# Patient Record
Sex: MALE | Race: WHITE | NOT HISPANIC OR LATINO | Employment: FULL TIME | URBAN - METROPOLITAN AREA
[De-identification: names, ages, dates, MRNs, and addresses within clinical notes are randomized per-mention and may not be internally consistent; named-entity substitution may affect disease eponyms.]

---

## 2023-12-02 ENCOUNTER — APPOINTMENT (EMERGENCY)
Dept: CT IMAGING | Facility: HOSPITAL | Age: 58
DRG: 378 | End: 2023-12-02
Payer: COMMERCIAL

## 2023-12-02 ENCOUNTER — HOSPITAL ENCOUNTER (INPATIENT)
Facility: HOSPITAL | Age: 58
LOS: 3 days | Discharge: HOME | DRG: 378 | End: 2023-12-05
Attending: EMERGENCY MEDICINE | Admitting: STUDENT IN AN ORGANIZED HEALTH CARE EDUCATION/TRAINING PROGRAM
Payer: COMMERCIAL

## 2023-12-02 DIAGNOSIS — D72.829 LEUKOCYTOSIS: ICD-10-CM

## 2023-12-02 DIAGNOSIS — E87.1 HYPONATREMIA: ICD-10-CM

## 2023-12-02 DIAGNOSIS — K92.2 GI BLEED: Primary | ICD-10-CM

## 2023-12-02 PROBLEM — R65.10 SIRS (SYSTEMIC INFLAMMATORY RESPONSE SYNDROME) (HCC): Status: ACTIVE | Noted: 2023-12-02

## 2023-12-02 PROBLEM — R73.03 PREDIABETES: Status: ACTIVE | Noted: 2023-12-02

## 2023-12-02 PROBLEM — E78.5 HLD (HYPERLIPIDEMIA): Status: ACTIVE | Noted: 2023-12-02

## 2023-12-02 PROBLEM — B20: Status: ACTIVE | Noted: 2023-12-02

## 2023-12-02 PROBLEM — Z21: Status: ACTIVE | Noted: 2023-12-02

## 2023-12-02 LAB
ABO GROUP BLD: NORMAL
ABO GROUP BLD: NORMAL
ALBUMIN SERPL BCP-MCNC: 3.9 G/DL (ref 3.5–5)
ALP SERPL-CCNC: 55 U/L (ref 34–104)
ALT SERPL W P-5'-P-CCNC: 15 U/L (ref 7–52)
ANION GAP SERPL CALCULATED.3IONS-SCNC: 12 MMOL/L
APTT PPP: 24 SECONDS (ref 23–37)
AST SERPL W P-5'-P-CCNC: 16 U/L (ref 13–39)
BACTERIA UR QL AUTO: NORMAL /HPF
BASOPHILS # BLD AUTO: 0.08 THOUSANDS/ÂΜL (ref 0–0.1)
BASOPHILS NFR BLD AUTO: 1 % (ref 0–1)
BILIRUB SERPL-MCNC: 0.5 MG/DL (ref 0.2–1)
BILIRUB UR QL STRIP: NEGATIVE
BLD GP AB SCN SERPL QL: NEGATIVE
BUN SERPL-MCNC: 20 MG/DL (ref 5–25)
CALCIUM SERPL-MCNC: 8.9 MG/DL (ref 8.4–10.2)
CHLORIDE SERPL-SCNC: 98 MMOL/L (ref 96–108)
CLARITY UR: CLEAR
CO2 SERPL-SCNC: 21 MMOL/L (ref 21–32)
COLOR UR: YELLOW
CREAT SERPL-MCNC: 0.78 MG/DL (ref 0.6–1.3)
EOSINOPHIL # BLD AUTO: 0.13 THOUSAND/ÂΜL (ref 0–0.61)
EOSINOPHIL NFR BLD AUTO: 1 % (ref 0–6)
ERYTHROCYTE [DISTWIDTH] IN BLOOD BY AUTOMATED COUNT: 13.3 % (ref 11.6–15.1)
FERRITIN SERPL-MCNC: 106 NG/ML (ref 24–336)
GFR SERPL CREATININE-BSD FRML MDRD: 98 ML/MIN/1.73SQ M
GLUCOSE SERPL-MCNC: 145 MG/DL (ref 65–140)
GLUCOSE SERPL-MCNC: 176 MG/DL (ref 65–140)
GLUCOSE SERPL-MCNC: 217 MG/DL (ref 65–140)
GLUCOSE UR STRIP-MCNC: NEGATIVE MG/DL
HCT VFR BLD AUTO: 23.9 % (ref 36.5–49.3)
HCT VFR BLD AUTO: 23.9 % (ref 36.5–49.3)
HCT VFR BLD AUTO: 25.9 % (ref 36.5–49.3)
HEMOCCULT STL QL IA: POSITIVE
HGB BLD-MCNC: 8.1 G/DL (ref 12–17)
HGB BLD-MCNC: 8.2 G/DL (ref 12–17)
HGB BLD-MCNC: 8.7 G/DL (ref 12–17)
HGB UR QL STRIP.AUTO: NEGATIVE
IMM GRANULOCYTES # BLD AUTO: 0.17 THOUSAND/UL (ref 0–0.2)
IMM GRANULOCYTES NFR BLD AUTO: 1 % (ref 0–2)
INR PPP: 1.04 (ref 0.84–1.19)
IRON SATN MFR SERPL: 23 % (ref 15–50)
IRON SERPL-MCNC: 85 UG/DL (ref 50–212)
KETONES UR STRIP-MCNC: NEGATIVE MG/DL
LEUKOCYTE ESTERASE UR QL STRIP: NEGATIVE
LYMPHOCYTES # BLD AUTO: 3.7 THOUSANDS/ÂΜL (ref 0.6–4.47)
LYMPHOCYTES NFR BLD AUTO: 23 % (ref 14–44)
MCH RBC QN AUTO: 30.5 PG (ref 26.8–34.3)
MCHC RBC AUTO-ENTMCNC: 33.6 G/DL (ref 31.4–37.4)
MCV RBC AUTO: 91 FL (ref 82–98)
MONOCYTES # BLD AUTO: 1.2 THOUSAND/ÂΜL (ref 0.17–1.22)
MONOCYTES NFR BLD AUTO: 8 % (ref 4–12)
NEUTROPHILS # BLD AUTO: 10.68 THOUSANDS/ÂΜL (ref 1.85–7.62)
NEUTS SEG NFR BLD AUTO: 66 % (ref 43–75)
NITRITE UR QL STRIP: NEGATIVE
NON-SQ EPI CELLS URNS QL MICRO: NORMAL /HPF
NRBC BLD AUTO-RTO: 0 /100 WBCS
PH UR STRIP.AUTO: 5.5 [PH]
PLATELET # BLD AUTO: 247 THOUSANDS/UL (ref 149–390)
PMV BLD AUTO: 9.2 FL (ref 8.9–12.7)
POTASSIUM SERPL-SCNC: 3.9 MMOL/L (ref 3.5–5.3)
PROCALCITONIN SERPL-MCNC: 0.05 NG/ML
PROT SERPL-MCNC: 6.6 G/DL (ref 6.4–8.4)
PROT UR STRIP-MCNC: NEGATIVE MG/DL
PROTHROMBIN TIME: 13.5 SECONDS (ref 11.6–14.5)
RBC # BLD AUTO: 2.85 MILLION/UL (ref 3.88–5.62)
RBC #/AREA URNS AUTO: NORMAL /HPF
RH BLD: POSITIVE
RH BLD: POSITIVE
SODIUM SERPL-SCNC: 131 MMOL/L (ref 135–147)
SP GR UR STRIP.AUTO: 1.01 (ref 1–1.03)
SPECIMEN EXPIRATION DATE: NORMAL
TIBC SERPL-MCNC: 367 UG/DL (ref 250–450)
UIBC SERPL-MCNC: 282 UG/DL (ref 155–355)
UROBILINOGEN UR QL STRIP.AUTO: 0.2 E.U./DL
WBC # BLD AUTO: 15.96 THOUSAND/UL (ref 4.31–10.16)
WBC #/AREA URNS AUTO: NORMAL /HPF

## 2023-12-02 PROCEDURE — 85025 COMPLETE CBC W/AUTO DIFF WBC: CPT | Performed by: EMERGENCY MEDICINE

## 2023-12-02 PROCEDURE — 99285 EMERGENCY DEPT VISIT HI MDM: CPT

## 2023-12-02 PROCEDURE — 86850 RBC ANTIBODY SCREEN: CPT | Performed by: EMERGENCY MEDICINE

## 2023-12-02 PROCEDURE — 80053 COMPREHEN METABOLIC PANEL: CPT | Performed by: EMERGENCY MEDICINE

## 2023-12-02 PROCEDURE — 30233N1 TRANSFUSION OF NONAUTOLOGOUS RED BLOOD CELLS INTO PERIPHERAL VEIN, PERCUTANEOUS APPROACH: ICD-10-PCS | Performed by: STUDENT IN AN ORGANIZED HEALTH CARE EDUCATION/TRAINING PROGRAM

## 2023-12-02 PROCEDURE — 85610 PROTHROMBIN TIME: CPT | Performed by: EMERGENCY MEDICINE

## 2023-12-02 PROCEDURE — P9016 RBC LEUKOCYTES REDUCED: HCPCS

## 2023-12-02 PROCEDURE — 86920 COMPATIBILITY TEST SPIN: CPT

## 2023-12-02 PROCEDURE — 85014 HEMATOCRIT: CPT | Performed by: EMERGENCY MEDICINE

## 2023-12-02 PROCEDURE — 87040 BLOOD CULTURE FOR BACTERIA: CPT | Performed by: STUDENT IN AN ORGANIZED HEALTH CARE EDUCATION/TRAINING PROGRAM

## 2023-12-02 PROCEDURE — 85730 THROMBOPLASTIN TIME PARTIAL: CPT | Performed by: EMERGENCY MEDICINE

## 2023-12-02 PROCEDURE — 83550 IRON BINDING TEST: CPT | Performed by: STUDENT IN AN ORGANIZED HEALTH CARE EDUCATION/TRAINING PROGRAM

## 2023-12-02 PROCEDURE — 85014 HEMATOCRIT: CPT | Performed by: STUDENT IN AN ORGANIZED HEALTH CARE EDUCATION/TRAINING PROGRAM

## 2023-12-02 PROCEDURE — 99223 1ST HOSP IP/OBS HIGH 75: CPT | Performed by: STUDENT IN AN ORGANIZED HEALTH CARE EDUCATION/TRAINING PROGRAM

## 2023-12-02 PROCEDURE — 82948 REAGENT STRIP/BLOOD GLUCOSE: CPT

## 2023-12-02 PROCEDURE — 86900 BLOOD TYPING SEROLOGIC ABO: CPT | Performed by: EMERGENCY MEDICINE

## 2023-12-02 PROCEDURE — 96365 THER/PROPH/DIAG IV INF INIT: CPT

## 2023-12-02 PROCEDURE — C9113 INJ PANTOPRAZOLE SODIUM, VIA: HCPCS | Performed by: EMERGENCY MEDICINE

## 2023-12-02 PROCEDURE — 86901 BLOOD TYPING SEROLOGIC RH(D): CPT | Performed by: EMERGENCY MEDICINE

## 2023-12-02 PROCEDURE — 36415 COLL VENOUS BLD VENIPUNCTURE: CPT | Performed by: EMERGENCY MEDICINE

## 2023-12-02 PROCEDURE — 96375 TX/PRO/DX INJ NEW DRUG ADDON: CPT

## 2023-12-02 PROCEDURE — 74177 CT ABD & PELVIS W/CONTRAST: CPT

## 2023-12-02 PROCEDURE — 93005 ELECTROCARDIOGRAM TRACING: CPT

## 2023-12-02 PROCEDURE — 84145 PROCALCITONIN (PCT): CPT | Performed by: STUDENT IN AN ORGANIZED HEALTH CARE EDUCATION/TRAINING PROGRAM

## 2023-12-02 PROCEDURE — 81001 URINALYSIS AUTO W/SCOPE: CPT | Performed by: STUDENT IN AN ORGANIZED HEALTH CARE EDUCATION/TRAINING PROGRAM

## 2023-12-02 PROCEDURE — 82728 ASSAY OF FERRITIN: CPT | Performed by: STUDENT IN AN ORGANIZED HEALTH CARE EDUCATION/TRAINING PROGRAM

## 2023-12-02 PROCEDURE — G0328 FECAL BLOOD SCRN IMMUNOASSAY: HCPCS | Performed by: EMERGENCY MEDICINE

## 2023-12-02 PROCEDURE — 83540 ASSAY OF IRON: CPT | Performed by: STUDENT IN AN ORGANIZED HEALTH CARE EDUCATION/TRAINING PROGRAM

## 2023-12-02 PROCEDURE — C9113 INJ PANTOPRAZOLE SODIUM, VIA: HCPCS | Performed by: STUDENT IN AN ORGANIZED HEALTH CARE EDUCATION/TRAINING PROGRAM

## 2023-12-02 PROCEDURE — 85018 HEMOGLOBIN: CPT | Performed by: EMERGENCY MEDICINE

## 2023-12-02 PROCEDURE — 85018 HEMOGLOBIN: CPT | Performed by: STUDENT IN AN ORGANIZED HEALTH CARE EDUCATION/TRAINING PROGRAM

## 2023-12-02 RX ORDER — INSULIN LISPRO 100 [IU]/ML
2-12 INJECTION, SOLUTION INTRAVENOUS; SUBCUTANEOUS
Status: DISCONTINUED | OUTPATIENT
Start: 2023-12-02 | End: 2023-12-05 | Stop reason: HOSPADM

## 2023-12-02 RX ORDER — SODIUM CHLORIDE, SODIUM GLUCONATE, SODIUM ACETATE, POTASSIUM CHLORIDE, MAGNESIUM CHLORIDE, SODIUM PHOSPHATE, DIBASIC, AND POTASSIUM PHOSPHATE .53; .5; .37; .037; .03; .012; .00082 G/100ML; G/100ML; G/100ML; G/100ML; G/100ML; G/100ML; G/100ML
100 INJECTION, SOLUTION INTRAVENOUS CONTINUOUS
Status: DISCONTINUED | OUTPATIENT
Start: 2023-12-02 | End: 2023-12-02

## 2023-12-02 RX ORDER — ONDANSETRON 2 MG/ML
4 INJECTION INTRAMUSCULAR; INTRAVENOUS EVERY 4 HOURS PRN
Status: DISCONTINUED | OUTPATIENT
Start: 2023-12-02 | End: 2023-12-05 | Stop reason: HOSPADM

## 2023-12-02 RX ORDER — OCTREOTIDE ACETATE 100 UG/ML
50 INJECTION, SOLUTION INTRAVENOUS; SUBCUTANEOUS ONCE
Status: DISCONTINUED | OUTPATIENT
Start: 2023-12-02 | End: 2023-12-02

## 2023-12-02 RX ORDER — AMLODIPINE BESYLATE 10 MG/1
10 TABLET ORAL DAILY
Status: DISCONTINUED | OUTPATIENT
Start: 2023-12-03 | End: 2023-12-05 | Stop reason: HOSPADM

## 2023-12-02 RX ORDER — PANTOPRAZOLE SODIUM 40 MG/10ML
40 INJECTION, POWDER, LYOPHILIZED, FOR SOLUTION INTRAVENOUS ONCE
Status: COMPLETED | OUTPATIENT
Start: 2023-12-02 | End: 2023-12-02

## 2023-12-02 RX ORDER — CANDESARTAN 32 MG/1
32 TABLET ORAL DAILY
COMMUNITY

## 2023-12-02 RX ORDER — ATORVASTATIN CALCIUM 20 MG/1
20 TABLET, FILM COATED ORAL DAILY
Status: DISCONTINUED | OUTPATIENT
Start: 2023-12-03 | End: 2023-12-05 | Stop reason: HOSPADM

## 2023-12-02 RX ORDER — AMLODIPINE BESYLATE 5 MG/1
10 TABLET ORAL DAILY
COMMUNITY

## 2023-12-02 RX ORDER — SODIUM CHLORIDE, SODIUM GLUCONATE, SODIUM ACETATE, POTASSIUM CHLORIDE, MAGNESIUM CHLORIDE, SODIUM PHOSPHATE, DIBASIC, AND POTASSIUM PHOSPHATE .53; .5; .37; .037; .03; .012; .00082 G/100ML; G/100ML; G/100ML; G/100ML; G/100ML; G/100ML; G/100ML
75 INJECTION, SOLUTION INTRAVENOUS CONTINUOUS
Status: DISCONTINUED | OUTPATIENT
Start: 2023-12-02 | End: 2023-12-04

## 2023-12-02 RX ORDER — LAMIVUDINE 150 MG/1
300 TABLET, FILM COATED ORAL DAILY
Status: DISCONTINUED | OUTPATIENT
Start: 2023-12-03 | End: 2023-12-05 | Stop reason: HOSPADM

## 2023-12-02 RX ORDER — ATORVASTATIN CALCIUM 20 MG/1
20 TABLET, FILM COATED ORAL DAILY
COMMUNITY

## 2023-12-02 RX ADMIN — SODIUM CHLORIDE, SODIUM GLUCONATE, SODIUM ACETATE, POTASSIUM CHLORIDE AND MAGNESIUM CHLORIDE 75 ML/HR: 526; 502; 368; 37; 30 INJECTION, SOLUTION INTRAVENOUS at 20:42

## 2023-12-02 RX ADMIN — PANTOPRAZOLE SODIUM 40 MG: 40 INJECTION, POWDER, FOR SOLUTION INTRAVENOUS at 09:26

## 2023-12-02 RX ADMIN — INSULIN LISPRO 4 UNITS: 100 INJECTION, SOLUTION INTRAVENOUS; SUBCUTANEOUS at 21:36

## 2023-12-02 RX ADMIN — PANTOPRAZOLE SODIUM 40 MG: 40 INJECTION, POWDER, FOR SOLUTION INTRAVENOUS at 18:11

## 2023-12-02 RX ADMIN — IOHEXOL 100 ML: 350 INJECTION, SOLUTION INTRAVENOUS at 11:55

## 2023-12-02 RX ADMIN — SODIUM CHLORIDE 8 MG/HR: 9 INJECTION, SOLUTION INTRAVENOUS at 18:22

## 2023-12-02 RX ADMIN — SODIUM CHLORIDE, SODIUM GLUCONATE, SODIUM ACETATE, POTASSIUM CHLORIDE, MAGNESIUM CHLORIDE, SODIUM PHOSPHATE, DIBASIC, AND POTASSIUM PHOSPHATE 100 ML/HR: .53; .5; .37; .037; .03; .012; .00082 INJECTION, SOLUTION INTRAVENOUS at 14:09

## 2023-12-02 NOTE — ASSESSMENT & PLAN NOTE
Patient follows with HIV doctor in [facility] which is out of network, but states that he is adherent to his daily Dovato in the outpatient setting. He pulled up his labs on his phone to show me baseline hgb, but we were unable to find recent CD4 count  Will presume well-controlled HIV in the setting of baseline good health, for now continue HIV regimen in-house and can reach out to his HIV clinic provider in the outpatient setting  Continue Dovato  qd.

## 2023-12-02 NOTE — ED NOTES
Pt ambulatory to restroom. Steady, independent gait. Denies dizziness.       Geremias Ervin RN  12/02/23 2251

## 2023-12-02 NOTE — ASSESSMENT & PLAN NOTE
Patient presents for 3 days of painless bright red blood per rectum. First episode appeared a little dark, but subsequent stool was bright red and bloody. PMH notable for history of diverticulosis as well as gastric bypass  Came to ED where hgb noted to be 8.7 and 8.1 on repeat. No recent baseline but last CBC in June shows hgb 14.7  For acute anemia and tachycardia, patient planned for 1U prbc and patient referred for admission. GI consulted  Diagnostic considerations for painless BRBPR may include diverticular bleed vs brisk upper GI bleed, suspect the former as bleeding has been going on for ~72   Given hx of gastric bypass will start protonix gtt for now.  Followup H&H q6h, clear liquid diet  Low threshold to cover with abx if patient deteriorates or develops fever  Supplemental IVF, Zofran PRN nausea  If continued bleeding or hemodynamic instability, consider transfer for higher level of care

## 2023-12-02 NOTE — ASSESSMENT & PLAN NOTE
Present on admission for tachycardia and leukocytosis at 15.96  No obvious source of infection at this time. Patient generally asymptomatic aside from mild abdominal bloating.   GI bleed could be cause for leukocytosis and subsequent anemia could cause tachycardia  Check blood cultures, but will hold off of covering with antibiotics at this time  SD2 status

## 2023-12-02 NOTE — PLAN OF CARE

## 2023-12-02 NOTE — ED PROVIDER NOTES
History  Chief Complaint   Patient presents with    Rectal Bleeding     Pt presents to ED from home w/ dizziness, blood in stool, fatigue for two days. Blood is elizabeth red. 42-year-old male presenting to the emergency department with lightheadedness, blood in the stool, fatigue x 2 days. Previous history of HIV compliant with his HAART medication, most recently normal CD4 count last month per the patient and undetectable HIV load. Also has a history of gastric bypass in 2011, Hx of gastric bypass 2011, hypertension. Does not have a history of hepatitis. Had a normal colonoscopy approximately 4 years ago. No recent receptive anal intercourse. Has been over a year since anything was placed in his rectum. He notes significant fatigue/ lightheaded. Has had three episodes of BRBPR in last 2 days. Last episode this am.     Not on blood thinners or ASA. None       Past Medical History:   Diagnosis Date    HIV (human immunodeficiency virus infection) ([address])     Hypertension        History reviewed. No pertinent surgical history. History reviewed. No pertinent family history. I have reviewed and agree with the history as documented. E-Cigarette/Vaping    E-Cigarette Use Never User      E-Cigarette/Vaping Substances     Social History     Tobacco Use    Smoking status: Never    Smokeless tobacco: Never   Vaping Use    Vaping Use: Never used   Substance Use Topics    Alcohol use: Yes     Comment: "drink more than i should"    Drug use: Yes     Types: Marijuana       Review of Systems   Gastrointestinal:  Positive for abdominal pain. Rectal bleeding     All other systems reviewed and are negative. Physical Exam  Physical Exam  Vitals and nursing note reviewed. Constitutional:       General: He is not in acute distress. Appearance: He is well-developed. He is not diaphoretic. HENT:      Head: Normocephalic and atraumatic.       Right Ear: External ear normal.      Left Ear: External ear normal.   Eyes:      Conjunctiva/sclera: Conjunctivae normal.   Neck:      Trachea: No tracheal deviation. Cardiovascular:      Rate and Rhythm: Regular rhythm. Tachycardia present. Heart sounds: Normal heart sounds. No murmur heard. Pulmonary:      Effort: No respiratory distress. Breath sounds: Normal breath sounds. No stridor. No wheezing or rales. Abdominal:      General: Bowel sounds are normal. There is no distension. Palpations: Abdomen is soft. There is no mass. Tenderness: There is abdominal tenderness (mild diffuse). There is no guarding or rebound. Genitourinary:     Comments: No bright red blood in the rectal vault. No external hemorrhoids. Musculoskeletal:         General: No tenderness or deformity. Skin:     General: Skin is dry. Coloration: Skin is pale. Findings: No rash. Neurological:      Motor: No abnormal muscle tone. Coordination: Coordination normal.   Psychiatric:         Behavior: Behavior normal.         Thought Content:  Thought content normal.         Judgment: Judgment normal.         Vital Signs  ED Triage Vitals   Temperature Pulse Respirations Blood Pressure SpO2   12/02/23 0906 12/02/23 0906 12/02/23 0906 12/02/23 0906 12/02/23 0906   97.6 °F (36.4 °C) (!) 109 (!) 25 100/57 98 %      Temp src Heart Rate Source Patient Position - Orthostatic VS BP Location FiO2 (%)   -- 12/02/23 1030 12/02/23 1030 12/02/23 1030 --    Monitor Sitting Right arm       Pain Score       12/02/23 1529       4           Vitals:    12/02/23 1200 12/02/23 1245 12/02/23 1330 12/02/23 1400   BP: 125/71 140/74 138/75 146/67   Pulse: 84 96 95 101   Patient Position - Orthostatic VS: Sitting Sitting Sitting Sitting         Visual Acuity      ED Medications  Medications   pantoprazole (PROTONIX) injection 40 mg (40 mg Intravenous Given 12/2/23 0926)   iohexol (OMNIPAQUE) 240 MG/ML solution 50 mL (50 mL Oral Given 12/2/23 0955)   iohexol (OMNIPAQUE) 350 MG/ML injection (SINGLE-DOSE) 100 mL (100 mL Intravenous Given 12/2/23 1155)       Diagnostic Studies  Results Reviewed       Procedure Component Value Units Date/Time    Hemoglobin and hematocrit, blood [[id number]]  (Abnormal) Collected: 12/02/23 1508    Lab Status: Final result Specimen: Blood from Arm, Left Updated: 12/02/23 1512     Hemoglobin 8.1 g/dL      Hematocrit 23.9 %     Occult Blood, Fecal Immunochemical [[id number]]  (Abnormal) Collected: 12/02/23 0948    Lab Status: Final result Specimen: Stool from Per Rectum Updated: 12/02/23 1026     OCCULT BLD, FECAL IMMUNOLOGICAL Positive    Narrative:        Performed by Fecal Immunochemical Test.    Protime-INR [[id number]]  (Normal) Collected: 12/02/23 0921    Lab Status: Final result Specimen: Blood from Arm, Left Updated: 12/02/23 0945     Protime 13.5 seconds      INR 1.04    APTT [[id number]]  (Normal) Collected: 12/02/23 0921    Lab Status: Final result Specimen: Blood from Arm, Left Updated: 12/02/23 0945     PTT 24 seconds     Comprehensive metabolic panel [[id number]]  (Abnormal) Collected: 12/02/23 0921    Lab Status: Final result Specimen: Blood from Arm, Left Updated: 12/02/23 0942     Sodium 131 mmol/L      Potassium 3.9 mmol/L      Chloride 98 mmol/L      CO2 21 mmol/L      ANION GAP 12 mmol/L      BUN 20 mg/dL      Creatinine 0.78 mg/dL      Glucose 176 mg/dL      Calcium 8.9 mg/dL      AST 16 U/L      ALT 15 U/L      Alkaline Phosphatase 55 U/L      Total Protein 6.6 g/dL      Albumin 3.9 g/dL      Total Bilirubin 0.50 mg/dL      eGFR 98 ml/min/1.73sq m     Narrative:      [facility] guidelines for Chronic Kidney Disease (CKD):     Stage 1 with normal or high GFR (GFR > 90 mL/min/1.73 square meters)    Stage 2 Mild CKD (GFR = 60-89 mL/min/1.73 square meters)    Stage 3A Moderate CKD (GFR = 45-59 mL/min/1.73 square meters)    Stage 3B Moderate CKD (GFR = 30-44 mL/min/1.73 square meters)    Stage 4 Severe CKD (GFR = 15-29 mL/min/1.73 square meters)    Stage 5 End Stage CKD (GFR <15 mL/min/1.73 square meters)  Note: GFR calculation is accurate only with a steady state creatinine    CBC and differential [[id number]]  (Abnormal) Collected: 12/02/23 0921    Lab Status: Final result Specimen: Blood from Arm, Left Updated: 12/02/23 0927     WBC 15.96 Thousand/uL      RBC 2.85 Million/uL      Hemoglobin 8.7 g/dL      Hematocrit 25.9 %      MCV 91 fL      MCH 30.5 pg      MCHC 33.6 g/dL      RDW 13.3 %      MPV 9.2 fL      Platelets 722 Thousands/uL      nRBC 0 /100 WBCs      Neutrophils Relative 66 %      Immat GRANS % 1 %      Lymphocytes Relative 23 %      Monocytes Relative 8 %      Eosinophils Relative 1 %      Basophils Relative 1 %      Neutrophils Absolute 10.68 Thousands/µL      Immature Grans Absolute 0.17 Thousand/uL      Lymphocytes Absolute 3.70 Thousands/µL      Monocytes Absolute 1.20 Thousand/µL      Eosinophils Absolute 0.13 Thousand/µL      Basophils Absolute 0.08 Thousands/µL                    CT abdomen pelvis with contrast   Final Result by Hans Trent MD (12/02 1335)   1. No acute intra-abdominal pathology. Incidental findings as above. Workstation performed: [id number]                    Procedures  CriticalCare Time    Date/Time: 12/2/2023 3:40 PM    Performed by: Rhonda Napoles DO  Authorized by: Rhonda Napoles DO    Critical care provider statement:     Critical care time (minutes):  30    Critical care start time:  12/2/2023 2:40 PM    Critical care end time:  12/2/2023 3:10 PM    Critical care time was exclusive of:  Separately billable procedures and treating other patients and teaching time    Critical care was necessary to treat or prevent imminent or life-threatening deterioration of the following conditions: Blood loss anemia.     Critical care was time spent personally by me on the following activities:  Discussions with consultants, examination of patient, re-evaluation of patient's condition, ordering and review of radiographic studies, ordering and review of laboratory studies and ordering and performing treatments and interventions           ED Course  ED Course as of 12/02/23 1545   Sat Dec 02, 2023   0939 Hemoglobin(!): 8.7   0946 Contacted GI. Can hold off on transfer now. Obtain CT with PO and IV. BUN normal will not order octreotide. 1239 Procedure Note: EKG  Date/Time: 12/02/23 12:39 PM   Interpreted by: Adria Chase  Indications / Diagnosis: GI bleed  ECG reviewed by me, the ED Provider: yes   The EKG demonstrates:  Rhythm: sinus tach 103 bpm with occasional pvc  Intervals: normal intervals  Axis: normal axis  QRS/Blocks: normal QRS  ST Changes: No acute ST Changes, no STD/MARY.     1447 Spoke with GI. They believe the patient is okay to stay at [facility] ([place]). Will scope on Monday if needed. If rebleeds might need emergent transfer. Spoke with Dr. Joanne Lee. Okay for clear liquids. Possible cleanout tomorrow   0676 Spoke with GI. They recommended recommended transfer 1 unit of blood. Medical Decision Making  Patient presenting with 3 episodes of rectal bleeding in the last 2 days. Normotensive but mildly tachycardic. Appears pale to me. Concerning. Will evaluate for blood loss anemia, electrolyte abnormalities, uremia, colitis, diverticulitis, acute intra-abdominal process. Spoke with GI shortly upon arrival of the patient. They recommended obtaining labs and imaging including p.o. contrast.    No uremia to suggest upper GI bleed. Patient had decrease in his tachycardia while in the emergency department. Normal rate most the time he is in the ER. Normotensive. I recommended transfusing 1 unit of packed red blood cells. Workup also shows leukocytosis. Unsure etiology. Spoke to GI. They did not recommend transfer at this point. No bright red blood per her rectum while in the ER for approximately 6 hours.   They recommended keeping at [facility] ([place]). Consult. Transfer if patient has acute bleeding. Problems Addressed:  GI bleed: acute illness or injury  Hyponatremia: acute illness or injury  Leukocytosis: acute illness or injury    Amount and/or Complexity of Data Reviewed  Labs: ordered. Decision-making details documented in ED Course. Radiology: ordered. Risk  Prescription drug management. Decision regarding hospitalization. Disposition  Final diagnoses:   GI bleed   Leukocytosis   Hyponatremia     Time reflects when diagnosis was documented in both MDM as applicable and the Disposition within this note       Time User Action Codes Description Comment    12/2/2023  2:18 PM Lisa Flaherty [K92.2] GI bleed     12/2/2023  3:11 PM Vaughn Clifford [G59.651] Leukocytosis     12/2/2023  3:11 PM Lisa Flaherty [E87.1] Hyponatremia           ED Disposition       ED Disposition   Admit    Condition   Stable    Date/Time   Sat Dec 2, 2023  3:09 PM    Comment   Ruchi Banda should be transferred out to [facility] Follow-up Information    None         There are no discharge medications for this patient. No discharge procedures on file.     PDMP Review       None            ED Provider  Electronically Signed by             Dc Robertson DO  12/02/23 0259

## 2023-12-02 NOTE — ASSESSMENT & PLAN NOTE
Wbc 15.96, no obvious source of infection  Check UA, otherwise would hold antibiotics and monitor  Low threshold to cover if patient develops fever or clinical deterioration

## 2023-12-02 NOTE — H&P
[address]  H&P  Name: Jean Geiger 59 y.o. male I MRN: [id number]  Unit/Bed#: -01 I Date of Admission: 12/2/2023   Date of Service: 12/2/2023 I Hospital Day: 0      Assessment/Plan   HIV (human immunodeficiency virus) infection [facility])  Assessment & Plan  Patient follows with HIV doctor in [facility] which is out of network, but states that he is adherent to his daily Dovato in the outpatient setting. He pulled up his labs on his phone to show me baseline hgb, but we were unable to find recent CD4 count  Will presume well-controlled HIV in the setting of baseline good health, for now continue HIV regimen in-house and can reach out to his HIV clinic provider in the outpatient setting  Continue Dovato  qd. Prediabetes  Assessment & Plan  Patient on trulicity as outpt, last A1c 5.9  Hold in-house, ISS ordered    HLD (hyperlipidemia)  Assessment & Plan  Resume atorvastatin 20mg    SIRS (systemic inflammatory response syndrome) (HCC)  Assessment & Plan  Present on admission for tachycardia and leukocytosis at 15.96  No obvious source of infection at this time. Patient generally asymptomatic aside from mild abdominal bloating. GI bleed could be cause for leukocytosis and subsequent anemia could cause tachycardia  Check blood cultures, but will hold off of covering with antibiotics at this time  SD2 status    Leukocytosis  Assessment & Plan  Wbc 15.96, no obvious source of infection  Check UA, otherwise would hold antibiotics and monitor  Low threshold to cover if patient develops fever or clinical deterioration    * GI bleed  Assessment & Plan  Patient presents for 3 days of painless bright red blood per rectum. First episode appeared a little dark, but subsequent stool was bright red and bloody. PMH notable for history of diverticulosis as well as gastric bypass  Came to ED where hgb noted to be 8.7 and 8.1 on repeat.    No recent baseline but last CBC in June shows hgb 14.7  For acute anemia and tachycardia, patient planned for 1U prbc and patient referred for admission. GI consulted  Diagnostic considerations for painless BRBPR may include diverticular bleed vs brisk upper GI bleed, suspect the former as bleeding has been going on for ~72   Given hx of gastric bypass will start protonix gtt for now. Followup H&H q6h, clear liquid diet  Low threshold to cover with abx if patient deteriorates or develops fever  Supplemental IVF, Zofran PRN nausea         VTE Pharmacologic Prophylaxis:   Moderate Risk (Score 3-4) - Pharmacological DVT Prophylaxis Contraindicated. Sequential Compression Devices Ordered. Code Status: Level 1 - Full Code   Discussion with family: Patient declined call to . Anticipated Length of Stay: Patient will be admitted on an inpatient basis with an anticipated length of stay of greater than 2 midnights secondary to GI bleed. Total Time Spent on Date of Encounter in care of patient: 45 mins. This time was spent on one or more of the following: performing physical exam; counseling and coordination of care; obtaining or reviewing history; documenting in the medical record; reviewing/ordering tests, medications or procedures; communicating with other healthcare professionals and discussing with patient's family/caregivers. Chief Complaint: Bright red blood per rectum    History of Present Illness:  Makenna Woodard is a 59 y.o. male with a PMH of Hypertension, HIV, HLD, diverticulosis, gastric bypass, prediabetes who presents with Bright red blood per rectum. He states that he was in a normal state of health until about 3 days ago when he began to notice a bit of dark stool after a bowel movement. That was soon followed by multiple episodes of bright red blood per rectum.  He began taking imodium to try and slow the rate of bleeding, which he states may have actually helped slow down the frequency of bleeding to daily episodes of bright red blood per rectum. Wilian Robertson has a history of diverticulosis as well as gastric bypass. Denies nausea or vomiting. He has no abdominal pain but does endorse some mild bloating. He came to the ED where hgb was noted to be 8.7. GI was contacted and recommend admission and blood transfusion. At bedside Wilian Robertson states he is feeling better after IVF given in the ED. He has no new complaints at this time. Denies cough, rash, fever, chills, dysuria. Review of Systems:  Review of Systems   Constitutional:  Negative for fatigue and fever. Respiratory:  Negative for cough and wheezing. Cardiovascular:  Negative for chest pain. Gastrointestinal:  Positive for anal bleeding. Negative for abdominal pain, diarrhea, nausea and vomiting. Genitourinary:  Negative for dysuria. Musculoskeletal:  Negative for arthralgias. Skin:  Negative for rash. Neurological:  Negative for tremors and headaches. Past Medical and Surgical History:   Past Medical History:   Diagnosis Date    HIV (human immunodeficiency virus infection) ([address])     Hypertension        History reviewed. No pertinent surgical history. Meds/Allergies:  Prior to Admission medications    Medication Sig Start Date End Date Taking? Authorizing Provider   amLODIPine (NORVASC) 5 mg tablet Take 10 mg by mouth daily   Yes Historical Provider, MD   atorvastatin (LIPITOR) 20 mg tablet Take 20 mg by mouth daily   Yes Historical Provider, MD   candesartan (ATACAND) 32 MG tablet Take 32 mg by mouth daily   Yes Historical Provider, MD   Dolutegravir-lamiVUDine  MG TABS Take 1 tablet by mouth daily   Yes Historical Provider, MD   dulaglutide (Trulicity) 1.5 MG/7.3TH injection Inject under the skin every 7 days   Yes Historical Provider, MD     I have reviewed home medications with patient personally.     Allergies: No Known Allergies    Social History:  Marital Status: Single   Occupation: Not on file  Patient Pre-hospital Living Situation: Home  Patient Pre-hospital Level of Mobility: walks  Patient Pre-hospital Diet Restrictions: None  Substance Use History:   Social History     Substance and Sexual Activity   Alcohol Use Yes    Comment: "drink more than i should"     Social History     Tobacco Use   Smoking Status Never   Smokeless Tobacco Never     Social History     Substance and Sexual Activity   Drug Use Yes    Types: Marijuana       Family History:  History reviewed. No pertinent family history. Physical Exam:     Vitals:   Blood Pressure: 130/75 (12/02/23 1645)  Pulse: 85 (12/02/23 1645)  Temperature: 97.7 °F (36.5 °C) (12/02/23 1645)  Temp Source: Oral (12/02/23 1645)  Respirations: 18 (12/02/23 1645)  Height: 6' 6" (198.1 cm) (12/02/23 1529)  Weight - Scale: 125 kg (275 lb) (12/02/23 0907)  SpO2: 96 % (12/02/23 1645)    Physical Exam  Vitals and nursing note reviewed. Constitutional:       General: He is not in acute distress. Appearance: He is well-developed. He is not toxic-appearing or diaphoretic. HENT:      Head: Normocephalic and atraumatic. Mouth/Throat:      Mouth: Mucous membranes are moist.   Eyes:      General: No scleral icterus. Extraocular Movements: Extraocular movements intact. Conjunctiva/sclera: Conjunctivae normal.   Cardiovascular:      Rate and Rhythm: Regular rhythm. Tachycardia present. Pulses: Normal pulses. Heart sounds: No murmur heard. No friction rub. No gallop. Pulmonary:      Effort: Pulmonary effort is normal. No respiratory distress. Breath sounds: Normal breath sounds. No wheezing, rhonchi or rales. Abdominal:      General: Abdomen is flat. Bowel sounds are normal. There is no distension. Palpations: Abdomen is soft. There is no mass. Tenderness: There is no abdominal tenderness. There is no guarding. Musculoskeletal:         General: No swelling. Cervical back: Neck supple. Right lower leg: No edema. Left lower leg: No edema.    Lymphadenopathy: Cervical: No cervical adenopathy. Skin:     General: Skin is warm and dry. Capillary Refill: Capillary refill takes less than 2 seconds. Coloration: Skin is not jaundiced. Findings: No rash. Neurological:      General: No focal deficit present. Mental Status: He is alert and oriented to person, place, and time. Sensory: No sensory deficit. Motor: No weakness. Psychiatric:         Mood and Affect: Mood normal.          Additional Data:     Lab Results:  Results from last 7 days   Lab Units 12/02/23  1508 12/02/23  0921   WBC Thousand/uL  --  15.96*   HEMOGLOBIN g/dL 8.1* 8.7*   HEMATOCRIT % 23.9* 25.9*   PLATELETS Thousands/uL  --  247   NEUTROS PCT %  --  66   LYMPHS PCT %  --  23   MONOS PCT %  --  8   EOS PCT %  --  1     Results from last 7 days   Lab Units 12/02/23  0921   SODIUM mmol/L 131*   POTASSIUM mmol/L 3.9   CHLORIDE mmol/L 98   CO2 mmol/L 21   BUN mg/dL 20   CREATININE mg/dL 0.78   ANION GAP mmol/L 12   CALCIUM mg/dL 8.9   ALBUMIN g/dL 3.9   TOTAL BILIRUBIN mg/dL 0.50   ALK PHOS U/L 55   ALT U/L 15   AST U/L 16   GLUCOSE RANDOM mg/dL 176*     Results from last 7 days   Lab Units 12/02/23  0921   INR  1.04                   Lines/Drains:  Invasive Devices       Peripheral Intravenous Line  Duration             Peripheral IV 12/02/23 Left Arm <1 day                        Imaging: Reviewed radiology reports from this admission including: abdominal/pelvic CT  CT abdomen pelvis with contrast   Final Result by Eric Ambriz MD (12/02 1335)   1. No acute intra-abdominal pathology. Incidental findings as above. Workstation performed: [id number]             EKG and Other Studies Reviewed on Admission:   EKG: Sinus Tachycardia. . ** Please Note: This note has been constructed using a voice recognition system.  **

## 2023-12-03 LAB
ABO GROUP BLD BPU: NORMAL
ANION GAP SERPL CALCULATED.3IONS-SCNC: 8 MMOL/L
ATRIAL RATE: 103 BPM
BPU ID: NORMAL
BUN SERPL-MCNC: 13 MG/DL (ref 5–25)
CALCIUM SERPL-MCNC: 8.4 MG/DL (ref 8.4–10.2)
CHLORIDE SERPL-SCNC: 100 MMOL/L (ref 96–108)
CO2 SERPL-SCNC: 26 MMOL/L (ref 21–32)
CREAT SERPL-MCNC: 0.53 MG/DL (ref 0.6–1.3)
CROSSMATCH: NORMAL
ERYTHROCYTE [DISTWIDTH] IN BLOOD BY AUTOMATED COUNT: 14.6 % (ref 11.6–15.1)
GFR SERPL CREATININE-BSD FRML MDRD: 114 ML/MIN/1.73SQ M
GLUCOSE SERPL-MCNC: 124 MG/DL (ref 65–140)
GLUCOSE SERPL-MCNC: 127 MG/DL (ref 65–140)
GLUCOSE SERPL-MCNC: 134 MG/DL (ref 65–140)
GLUCOSE SERPL-MCNC: 146 MG/DL (ref 65–140)
GLUCOSE SERPL-MCNC: 155 MG/DL (ref 65–140)
HCT VFR BLD AUTO: 25 % (ref 36.5–49.3)
HCT VFR BLD AUTO: 25.6 % (ref 36.5–49.3)
HCT VFR BLD AUTO: 26 % (ref 36.5–49.3)
HCT VFR BLD AUTO: 26.1 % (ref 36.5–49.3)
HGB BLD-MCNC: 8.6 G/DL (ref 12–17)
HGB BLD-MCNC: 8.8 G/DL (ref 12–17)
HGB BLD-MCNC: 8.8 G/DL (ref 12–17)
HGB BLD-MCNC: 9 G/DL (ref 12–17)
MCH RBC QN AUTO: 30.6 PG (ref 26.8–34.3)
MCHC RBC AUTO-ENTMCNC: 33.7 G/DL (ref 31.4–37.4)
MCV RBC AUTO: 91 FL (ref 82–98)
P AXIS: 77 DEGREES
PLATELET # BLD AUTO: 198 THOUSANDS/UL (ref 149–390)
PMV BLD AUTO: 8.9 FL (ref 8.9–12.7)
POTASSIUM SERPL-SCNC: 3.8 MMOL/L (ref 3.5–5.3)
PR INTERVAL: 180 MS
PROCALCITONIN SERPL-MCNC: <0.05 NG/ML
QRS AXIS: 34 DEGREES
QRSD INTERVAL: 94 MS
QT INTERVAL: 368 MS
QTC INTERVAL: 482 MS
RBC # BLD AUTO: 2.88 MILLION/UL (ref 3.88–5.62)
SODIUM SERPL-SCNC: 134 MMOL/L (ref 135–147)
T WAVE AXIS: 71 DEGREES
UNIT DISPENSE STATUS: NORMAL
UNIT PRODUCT CODE: NORMAL
UNIT PRODUCT VOLUME: 350 ML
UNIT RH: NORMAL
VENTRICULAR RATE: 103 BPM
WBC # BLD AUTO: 10.48 THOUSAND/UL (ref 4.31–10.16)

## 2023-12-03 PROCEDURE — 85027 COMPLETE CBC AUTOMATED: CPT | Performed by: STUDENT IN AN ORGANIZED HEALTH CARE EDUCATION/TRAINING PROGRAM

## 2023-12-03 PROCEDURE — C9113 INJ PANTOPRAZOLE SODIUM, VIA: HCPCS | Performed by: STUDENT IN AN ORGANIZED HEALTH CARE EDUCATION/TRAINING PROGRAM

## 2023-12-03 PROCEDURE — P9016 RBC LEUKOCYTES REDUCED: HCPCS

## 2023-12-03 PROCEDURE — 99222 1ST HOSP IP/OBS MODERATE 55: CPT | Performed by: INTERNAL MEDICINE

## 2023-12-03 PROCEDURE — 84145 PROCALCITONIN (PCT): CPT | Performed by: PHYSICIAN ASSISTANT

## 2023-12-03 PROCEDURE — 85014 HEMATOCRIT: CPT | Performed by: STUDENT IN AN ORGANIZED HEALTH CARE EDUCATION/TRAINING PROGRAM

## 2023-12-03 PROCEDURE — 85018 HEMOGLOBIN: CPT | Performed by: STUDENT IN AN ORGANIZED HEALTH CARE EDUCATION/TRAINING PROGRAM

## 2023-12-03 PROCEDURE — 87505 NFCT AGENT DETECTION GI: CPT | Performed by: STUDENT IN AN ORGANIZED HEALTH CARE EDUCATION/TRAINING PROGRAM

## 2023-12-03 PROCEDURE — 82948 REAGENT STRIP/BLOOD GLUCOSE: CPT

## 2023-12-03 PROCEDURE — 80048 BASIC METABOLIC PNL TOTAL CA: CPT | Performed by: STUDENT IN AN ORGANIZED HEALTH CARE EDUCATION/TRAINING PROGRAM

## 2023-12-03 PROCEDURE — 99232 SBSQ HOSP IP/OBS MODERATE 35: CPT | Performed by: PHYSICIAN ASSISTANT

## 2023-12-03 RX ORDER — LEVOTHYROXINE SODIUM 112 UG/1
TABLET ORAL
Status: DISPENSED
Start: 2023-12-03 | End: 2023-12-03

## 2023-12-03 RX ORDER — ALPRAZOLAM 0.25 MG/1
0.25 TABLET ORAL 2 TIMES DAILY PRN
Status: DISCONTINUED | OUTPATIENT
Start: 2023-12-03 | End: 2023-12-05 | Stop reason: HOSPADM

## 2023-12-03 RX ORDER — POLYETHYLENE GLYCOL 3350 17 G/17G
238 POWDER, FOR SOLUTION ORAL ONCE
Status: COMPLETED | OUTPATIENT
Start: 2023-12-03 | End: 2023-12-03

## 2023-12-03 RX ORDER — LEVOTHYROXINE SODIUM 0.03 MG/1
TABLET ORAL
Status: DISPENSED
Start: 2023-12-03 | End: 2023-12-03

## 2023-12-03 RX ADMIN — DOXYLAMINE SUCCINATE 12.5 MG: 25 TABLET ORAL at 00:39

## 2023-12-03 RX ADMIN — INSULIN LISPRO 2 UNITS: 100 INJECTION, SOLUTION INTRAVENOUS; SUBCUTANEOUS at 17:02

## 2023-12-03 RX ADMIN — AMLODIPINE BESYLATE 10 MG: 10 TABLET ORAL at 08:35

## 2023-12-03 RX ADMIN — SODIUM CHLORIDE 8 MG/HR: 9 INJECTION, SOLUTION INTRAVENOUS at 13:42

## 2023-12-03 RX ADMIN — SODIUM CHLORIDE, SODIUM GLUCONATE, SODIUM ACETATE, POTASSIUM CHLORIDE AND MAGNESIUM CHLORIDE 75 ML/HR: 526; 502; 368; 37; 30 INJECTION, SOLUTION INTRAVENOUS at 10:11

## 2023-12-03 RX ADMIN — ALPRAZOLAM 0.25 MG: 0.25 TABLET ORAL at 00:24

## 2023-12-03 RX ADMIN — ATORVASTATIN CALCIUM 20 MG: 20 TABLET, FILM COATED ORAL at 08:34

## 2023-12-03 RX ADMIN — LAMIVUDINE 300 MG: 150 TABLET, FILM COATED ORAL at 08:35

## 2023-12-03 RX ADMIN — DOLUTEGRAVIR SODIUM 50 MG: 50 TABLET, FILM COATED ORAL at 08:35

## 2023-12-03 RX ADMIN — SODIUM CHLORIDE 8 MG/HR: 9 INJECTION, SOLUTION INTRAVENOUS at 02:16

## 2023-12-03 RX ADMIN — POLYETHYLENE GLYCOL 3350 238 G: 17 POWDER, FOR SOLUTION ORAL at 17:02

## 2023-12-03 NOTE — H&P (VIEW-ONLY)
Consultation -  [facility]  Marleni Clarke 59 y.o. male MRN: [id number]  Unit/Bed#: -01 Encounter: [id number]        Inpatient consult to gastroenterology  Consult performed by: Ludwig Kaur MD  Consult ordered by: Clarisse Gamez          ASSESSMENT/PLAN:     70-year-old gentleman, history of gastric bypass, history of cholecystectomy, presents with hematochezia, 4 days. Notable drop in hemoglobin, baseline hemoglobin on the patient's outpatient labs were 14 several months ago, 8.7 on admission. Hemodynamically stable. He has been taking Imodium. Last colonoscopy 5 years ago. 1.  Hematochezia: Most likely diverticular bleed given presentation and overall stable appearance however upper GI bleed in patient with gastric bypass is also on the differential  -Continue to follow hemoglobin, transfuse as clinically indicated  -Continue with Protonix drip  -Plan for EGD and colonoscopy tomorrow to further evaluate  -If he has any active bleeding overnight please call us and we will have him transferred to           ______________________________________________________________________    Reason for Consult / Principal Problem: GI bleed    HPI: Marleni Clarke is a 59y.o. year old male who presents with GI bleed this is a pleasant 70-year-old gentleman, history of HIV, well-controlled, reports that his HIV has been well treated on antiretroviral therapy. Patient was in his usual state of health, has a remote history of gastric bypass. 3 days prior to admission patient had what he describes as a maroon bloody bowel movement without any associate abdominal pain, lightheaded, dizziness after seeing this he decided to take an Imodium. The next day had another similar episode and took Imodium again finally had another episode on the day of admission finally prompting admission. He denies any lightheadedness, dizziness, nausea, vomiting.   He did report over the same period from having upper abdominal pain and discomfort which she thought was gas pains and had been taking over-the-counter antacids for this. Denies any prior history peptic ulcer disease. Rarely takes Advil. Last colonoscopy 5 years ago. No prior history of GI bleed. No anticoagulation. Since admission patient had another bloody bowel movement this morning. He received 1 unit of packed red cells and has been hemodynamically stable. Blood pressures have been stable throughout his course. He had a CT scan on admission which was unremarkable other than evidence of prior gastric bypass. Medical history is notable for HIV and hypertension. Surgical history is notable for gastric bypass, cholecystectomy    Patient denies any tobacco, drinks 2-3 drinks nightly. He works in human resources. Denies family history of GI associated malignancies. Review of Systems: The remainder of the review of systems was negative except for the pertinent positives noted in HPI. Historical Information   Past Medical History:   Diagnosis Date    HIV (human immunodeficiency virus infection) ([address])     Hypertension      History reviewed. No pertinent surgical history. Social History   Social History     Substance and Sexual Activity   Alcohol Use Yes    Comment: "drink more than i should"     Social History     Substance and Sexual Activity   Drug Use Yes    Types: Marijuana     Social History     Tobacco Use   Smoking Status Never   Smokeless Tobacco Never     History reviewed. No pertinent family history.     Meds/Allergies     Medications Prior to Admission   Medication    amLODIPine (NORVASC) 5 mg tablet    atorvastatin (LIPITOR) 20 mg tablet    candesartan (ATACAND) 32 MG tablet    Dolutegravir-lamiVUDine  MG TABS    dulaglutide (Trulicity) 1.5 XL/7.0AP injection     Current Facility-Administered Medications   Medication Dose Route Frequency    ALPRAZolam (XANAX) tablet 0.25 mg  0.25 mg Oral BID PRN    amLODIPine (NORVASC) tablet 10 mg  10 mg Oral Daily    atorvastatin (LIPITOR) tablet 20 mg  20 mg Oral Daily    dolutegravir (TIVICAY) tablet 50 mg  50 mg Oral Daily    And    lamiVUDine (EPIVIR) tablet 300 mg  300 mg Oral Daily    doxylamine (UNISOM) tablet 12.5 mg  12.5 mg Oral HS PRN    insulin lispro (HumaLOG) 100 units/mL subcutaneous injection 2-12 Units  2-12 Units Subcutaneous TID AC    insulin lispro (HumaLOG) 100 units/mL subcutaneous injection 2-12 Units  2-12 Units Subcutaneous HS    multi-electrolyte (PLASMALYTE-A/ISOLYTE-S PH 7.4) IV solution  75 mL/hr Intravenous Continuous    ondansetron (ZOFRAN) injection 4 mg  4 mg Intravenous Q4H PRN    pantoprazole (PROTONIX) 80 mg in sodium chloride 0.9 % 100 mL infusion  8 mg/hr Intravenous Continuous       No Known Allergies    Objective     Blood pressure 140/84, pulse 84, temperature 97.8 °F (36.6 °C), temperature source Oral, resp. rate 18, height 6' 6" (1.981 m), weight 125 kg (275 lb), SpO2 96 %.       Intake/Output Summary (Last 24 hours) at 12/3/2023 1407  Last data filed at 12/3/2023 0758  Gross per 24 hour   Intake 1498 ml   Output 2100 ml   Net -602 ml       PHYSICAL EXAM     GEN: well nourished, well developed, no acute distress, overweight  HEENT: anicteric, MMM, no cervical or supraclavicular lymphadenopathy  CV: RRR, no m/r/g  CHEST: CTA b/l, no WRR  ABD: +BS, soft, NT/ND, no hepatosplenomegaly  EXT: no c/c/e  SKIN: no rashes,  NEURO: aaox3    Lab Results:   Admission on 12/02/2023   Component Date Value    ABO Grouping 12/02/2023 O     Rh Factor 12/02/2023 Positive     Antibody Screen 12/02/2023 Negative     Specimen Expiration Date 12/02/2023 [id number]     WBC 12/02/2023 15.96 (H)     RBC 12/02/2023 2.85 (L)     Hemoglobin 12/02/2023 8.7 (L)     Hematocrit 12/02/2023 25.9 (L)     MCV 12/02/2023 91     MCH 12/02/2023 30.5     MCHC 12/02/2023 33.6     RDW 12/02/2023 13.3     MPV 12/02/2023 9.2     Platelets 91/92/8010 247     nRBC 12/02/2023 0     Neutrophils Relative 12/02/2023 66     Immat GRANS % 12/02/2023 1     Lymphocytes Relative 12/02/2023 23     Monocytes Relative 12/02/2023 8     Eosinophils Relative 12/02/2023 1     Basophils Relative 12/02/2023 1     Neutrophils Absolute 12/02/2023 10.68 (H)     Immature Grans Absolute 12/02/2023 0.17     Lymphocytes Absolute 12/02/2023 3.70     Monocytes Absolute 12/02/2023 1.20     Eosinophils Absolute 12/02/2023 0.13     Basophils Absolute 12/02/2023 0.08     Sodium 12/02/2023 131 (L)     Potassium 12/02/2023 3.9     Chloride 12/02/2023 98     CO2 12/02/2023 21     ANION GAP 12/02/2023 12     BUN 12/02/2023 20     Creatinine 12/02/2023 0.78     Glucose 12/02/2023 176 (H)     Calcium 12/02/2023 8.9     AST 12/02/2023 16     ALT 12/02/2023 15     Alkaline Phosphatase 12/02/2023 55     Total Protein 12/02/2023 6.6     Albumin 12/02/2023 3.9     Total Bilirubin 12/02/2023 0.50     eGFR 12/02/2023 98     Protime 12/02/2023 13.5     INR 12/02/2023 1.04     PTT 12/02/2023 24     Ventricular Rate 12/02/2023 103     Atrial Rate 12/02/2023 103     NJ Interval 12/02/2023 180     QRSD Interval 12/02/2023 94     QT Interval 12/02/2023 368     QTC Interval 12/02/2023 482     P Axis 12/02/2023 77     QRS Axis 12/02/2023 34     T Wave [place] 12/02/2023 71     ABO Grouping 12/02/2023 O     Rh Factor 12/02/2023 Positive     OCCULT BLD, FECAL IMMUNO* 12/02/2023 Positive (A)     Unit Product Code 12/03/2023 F8532H08     Unit Number 12/03/2023 [id number]     Unit ABO 12/03/2023 O     Unit RH 12/03/2023 POS     Crossmatch 12/03/2023 Compatible     Unit Dispense Status 12/03/2023 Presumed Trans     Unit Product Volume 12/03/2023 350     Hemoglobin 12/02/2023 8.1 (L)     Hematocrit 12/02/2023 23.9 (L)     Hemoglobin 12/02/2023 8.2 (L)     Hematocrit 12/02/2023 23.9 (L)     Color, UA 12/02/2023 Yellow     Clarity, UA 12/02/2023 Clear     Specific Gravity, UA 12/02/2023 1.015     pH, UA 12/02/2023 5.5     Leukocytes, UA 12/02/2023 Negative Nitrite, UA 12/02/2023 Negative     Protein, UA 12/02/2023 Negative     Glucose, UA 12/02/2023 Negative     Ketones, UA 12/02/2023 Negative     Urobilinogen, UA 12/02/2023 0.2     Bilirubin, UA 12/02/2023 Negative     Occult Blood, UA 12/02/2023 Negative     RBC, UA 12/02/2023 None Seen     WBC, UA 12/02/2023 None Seen     Epithelial Cells 12/02/2023 Occasional     Bacteria, UA 12/02/2023 Occasional     Blood Culture 12/02/2023 Received in Microbiology Lab. Culture in Progress. Blood Culture 12/02/2023 Received in Microbiology Lab. Culture in Progress. Procalcitonin 12/02/2023 0.05     Iron Saturation 12/02/2023 23     TIBC 12/02/2023 367     Iron 12/02/2023 85     UIBC 12/02/2023 282     Ferritin 12/02/2023 106     POC Glucose 12/02/2023 145 (H)     Hemoglobin 12/03/2023 8.6 (L)     Hematocrit 12/03/2023 25.0 (L)     POC Glucose 12/02/2023 217 (H)     WBC 12/03/2023 10.48 (H)     RBC 12/03/2023 2.88 (L)     Hemoglobin 12/03/2023 8.8 (L)     Hematocrit 12/03/2023 26.1 (L)     MCV 12/03/2023 91     MCH 12/03/2023 30.6     MCHC 12/03/2023 33.7     RDW 12/03/2023 14.6     Platelets 57/71/2357 198     MPV 12/03/2023 8.9     Sodium 12/03/2023 134 (L)     Potassium 12/03/2023 3.8     Chloride 12/03/2023 100     CO2 12/03/2023 26     ANION GAP 12/03/2023 8     BUN 12/03/2023 13     Creatinine 12/03/2023 0.53 (L)     Glucose 12/03/2023 134     Calcium 12/03/2023 8.4     eGFR 12/03/2023 114     POC Glucose 12/03/2023 146 (H)     Procalcitonin 12/03/2023 <0.05     Hemoglobin 12/03/2023 8.8 (L)     Hematocrit 12/03/2023 25.6 (L)     POC Glucose 12/03/2023 127     Unit Product Code 12/03/2023 [id number]     Unit Number 12/03/2023 [id number]     Unit ABO 12/03/2023 O     Unit RH 12/03/2023 POS     Crossmatch 12/03/2023 Compatible     Unit Dispense Status 12/03/2023 Crossmatched     Unit Product Volume 12/03/2023 350      Imaging Studies: I have personally reviewed pertinent reports.

## 2023-12-03 NOTE — UTILIZATION REVIEW
Initial Clinical Review    Admission: Date/Time/Statement:   Admission Orders (From admission, onward)       Ordered        12/02/23 1510  INPATIENT ADMISSION  Once                          Orders Placed This Encounter   Procedures    INPATIENT ADMISSION     Standing Status:   Standing     Number of Occurrences:   1     Order Specific Question:   Level of Care     Answer:   Med Surg [16]     Order Specific Question:   Estimated length of stay     Answer:   More than 2 Midnights     Order Specific Question:   Certification     Answer:   I certify that inpatient services are medically necessary for this patient for a duration of greater than two midnights. See H&P and MD Progress Notes for additional information about the patient's course of treatment. ED Arrival Information       Expected   -    Arrival   12/2/2023 08:58    Acuity   Emergent              Means of arrival   Walk-In    Escorted by   Self    Service   Hospitalist    Admission type   Emergency              Arrival complaint   Blood in Stool             Chief Complaint   Patient presents with    Rectal Bleeding     Pt presents to ED from home w/ dizziness, blood in stool, fatigue for two days. Blood is elizabeth red. Initial Presentation:   59 yom to ER from home for  lightheadedness, blood in the stool, fatigue x 2 days. Previous history of HIV compliant with his HAART medication, most recently normal CD4 count last month per the patient and undetectable HIV load. Also has a history of gastric bypass in 2011, Hx of gastric bypass 2011, hypertension. Presents tachycardic, tachypneic, diffuse abd tenderness, no bright red blood in the rectal vault. No external hemorrhoids. Admission work-up showing Hgb 8.1, hyponatremia. Admitted to inpatient status for GI bleed. Started on IV Protonix gtt, transfusion ordered, H&H q6h. Date: 12/3/23   Day 2:   Persistent bloody stool. Give additional 1 unit PRBC, Hgb 8.8 today.  GI consulted, plan NPO at midnight for EGD/colonoscopy tomorrow.     Per GI: Hematochezia: Most likely diverticular bleed given presentation and overall stable appearance however upper GI bleed in patient with gastric bypass is also on the differential  -Continue to follow hemoglobin, transfuse as clinically indicated  -Continue with Protonix drip  -Plan for EGD and colonoscopy tomorrow to further evaluate    ED Triage Vitals   Temperature Pulse Respirations Blood Pressure SpO2   12/02/23 0906 12/02/23 0906 12/02/23 0906 12/02/23 0906 12/02/23 0906   97.6 °F (36.4 °C) (!) 109 (!) 25 100/57 98 %      Temp Source Heart Rate Source Patient Position - Orthostatic VS BP Location FiO2 (%)   12/02/23 1630 12/02/23 1030 12/02/23 1030 12/02/23 1030 --   Oral Monitor Sitting Right arm       Pain Score       12/02/23 1529       4          Wt Readings from Last 1 Encounters:   12/02/23 125 kg (275 lb)     Additional Vital Signs:   Date/Time Temp Pulse Resp BP MAP (mmHg) SpO2 O2 Device Patient Position - Orthostatic VS   12/03/23 0830 97.8 °F (36.6 °C) 84 18 140/84 -- 96 % None (Room air) Lying   12/03/23 0500 98 °F (36.7 °C) 81 18 122/74 82 98 % None (Room air) Lying   12/03/23 0030 97.7 °F (36.5 °C) 85 18 132/78 97 97 % None (Room air) Lying   12/02/23 2138 98 °F (36.7 °C) 89 18 128/70 97 96 % None (Room air) Lying   12/02/23 2039 98.2 °F (36.8 °C) 80 18 151/64 -- 98 % None (Room air) --   12/02/23 2000 98 °F (36.7 °C) 89 18 142/76 104 96 % None (Room air) Lying   12/02/23 1945 98.1 °F (36.7 °C) 82 18 131/75 88 97 % None (Room air) Lying   12/02/23 1700 98.3 °F (36.8 °C) 87 18 141/81 -- 96 % None (Room air) Lying   12/02/23 1645 97.7 °F (36.5 °C) 85 18 130/75 93 96 % None (Room air) Sitting   12/02/23 1630 98 °F (36.7 °C) 86 18 132/72 -- -- None (Room air) --   12/02/23 1400 -- 101 20 146/67 96 96 % None (Room air) Sitting   12/02/23 1330 -- 95 18 138/75 96 94 % None (Room air) Sitting   12/02/23 1245 -- 96 19 140/74 98 96 % None (Room air) Sitting 12/02/23 1200 -- 84 15 125/71 92 98 % None (Room air) Sitting   12/02/23 1130 -- 81 16 129/62 88 97 % None (Room air) Sitting   12/02/23 1100 -- 82 21 129/64 88 98 % None (Room air) Sitting   12/02/23 1030 -- 85 20 120/79 94 98 % None (Room air) Sitting   12/02/23 1000 -- 86 24 Abnormal  114/62 81 98 % -- --   12/02/23 0906 97.6 °F (36.4 °C) 109 Abnormal  25 Abnormal  100/57 -- 98 % None (Room air) --     Pertinent Labs/Diagnostic Test Results:   CT abdomen pelvis with contrast   Final Result  (12/02 1335)   1. No acute intra-abdominal pathology. Incidental findings as above.         12/2 EKG=  Sinus tachycardia       Results from last 7 days   Lab Units 12/03/23  1130 12/03/23  0614 12/03/23  0016 12/02/23  1819 12/02/23  1508 12/02/23  0921   WBC Thousand/uL  --  10.48*  --   --   --  15.96*   HEMOGLOBIN g/dL 8.8* 8.8* 8.6* 8.2* 8.1* 8.7*   HEMATOCRIT % 25.6* 26.1* 25.0* 23.9* 23.9* 25.9*   PLATELETS Thousands/uL  --  198  --   --   --  247   NEUTROS ABS Thousands/µL  --   --   --   --   --  10.68*     Results from last 7 days   Lab Units 12/03/23  0614 12/02/23  0921   SODIUM mmol/L 134* 131*   POTASSIUM mmol/L 3.8 3.9   CHLORIDE mmol/L 100 98   CO2 mmol/L 26 21   ANION GAP mmol/L 8 12   BUN mg/dL 13 20   CREATININE mg/dL 0.53* 0.78   EGFR ml/min/1.73sq m 114 98   CALCIUM mg/dL 8.4 8.9     Results from last 7 days   Lab Units 12/02/23  0921   AST U/L 16   ALT U/L 15   ALK PHOS U/L 55   TOTAL PROTEIN g/dL 6.6   ALBUMIN g/dL 3.9   TOTAL BILIRUBIN mg/dL 0.50     Results from last 7 days   Lab Units 12/03/23  1109 12/03/23  0711 12/02/23  2036 12/02/23  1808   POC GLUCOSE mg/dl 127 146* 217* 145*     Results from last 7 days   Lab Units 12/03/23  0614 12/02/23  0921   GLUCOSE RANDOM mg/dL 134 176*     Results from last 7 days   Lab Units 12/02/23  0921   PROTIME seconds 13.5   INR  1.04   PTT seconds 24     Results from last 7 days   Lab Units 12/03/23  0614 12/02/23  0921   PROCALCITONIN ng/ml <0.05 0.05 Results from last 7 days   Lab Units 12/02/23  0921   FERRITIN ng/mL 106   IRON SATURATION % 23   IRON ug/dL 85   TIBC ug/dL 367     Results from last 7 days   Lab Units 12/03/23  1238 12/03/23  0630   UNIT PRODUCT CODE  [id number] [id number]   UNIT NUMBER  [id number]   UNITABO  O O   UNITRH  POS POS   CROSSMATCH  Compatible Compatible   UNIT DISPENSE STATUS  Crossmatched Presumed Trans   UNIT PRODUCT VOL ml 350 350     Results from last 7 days   Lab Units 12/02/23  2146   CLARITY UA  Clear   COLOR UA  Yellow   SPEC GRAV UA  1.015   PH UA  5.5   GLUCOSE UA mg/dl Negative   KETONES UA mg/dl Negative   BLOOD UA  Negative   PROTEIN UA mg/dl Negative   NITRITE UA  Negative   BILIRUBIN UA  Negative   UROBILINOGEN UA E.U./dl 0.2   LEUKOCYTES UA  Negative   WBC UA /hpf None Seen   RBC UA /hpf None Seen   BACTERIA UA /hpf Occasional   EPITHELIAL CELLS WET PREP /hpf Occasional     Results from last 7 days   Lab Units 12/02/23  1832 12/02/23  1821   BLOOD CULTURE  Received in Microbiology Lab. Culture in Progress. Received in Microbiology Lab. Culture in Progress.      ED Treatment:   Medication Administration from 12/02/2023 0858 to 12/02/2023 1525         Date/Time Order Dose Route Action Action by Comments     12/02/2023 0926 EST pantoprazole (PROTONIX) injection 40 mg 40 mg Intravenous Given Lauro Ta RN --     12/02/2023 0955 EST iohexol (OMNIPAQUE) 240 MG/ML solution 50 mL 50 mL Oral Given Reta Tinoco --     12/02/2023 1409 EST multi-electrolyte (PLASMALYTE-A/ISOLYTE-S PH 7.4) IV solution 100 mL/hr Intravenous New Bag Joelle Oates RN --          Past Medical History:   Diagnosis Date    HIV (human immunodeficiency virus infection) ([address])     Hypertension      Admitting Diagnosis: Hyponatremia [E87.1]  Leukocytosis [D72.829]  GI bleed [K92.2]  Rectal bleed [K62.5]  Age/Sex: 59 y.o. male  Admission Orders:  Consult GI  Stool record at bedside  Accuchecks  Scd/foot pumps  [facility]     Scheduled Medications:  amLODIPine, 10 mg, Oral, Daily  atorvastatin, 20 mg, Oral, Daily  dolutegravir, 50 mg, Oral, Daily   And  lamiVUDine, 300 mg, Oral, Daily  insulin lispro, 2-12 Units, Subcutaneous, TID AC  insulin lispro, 2-12 Units, Subcutaneous, HS    Continuous IV Infusions:  multi-electrolyte, 75 mL/hr, Intravenous, Continuous  pantoprazole (PROTONIX) 80 mg in sodium chloride 0.9 % 100 mL infusion, 8 mg/hr, Intravenous, Continuous    PRN Meds:  ALPRAZolam, 0.25 mg, Oral, BID PRN  doxylamine, 12.5 mg, Oral, HS PRN  ondansetron, 4 mg, Intravenous, Q4H PRN    Network Utilization Review Department  ATTENTION: Please call with any questions or concerns to [phone] and carefully listen to the prompts so that you are directed to the right person. All voicemails are confidential.   For Discharge needs, contact Care Management DC Support Team at [phone] opt. 2  Send all requests for admission clinical reviews, approved or denied determinations and any other requests to dedicated fax number below belonging to the campus where the patient is receiving treatment.  List of dedicated fax numbers for the Facilities:  [place] DENIALS (Administrative/Medical Necessity) [phone]   DISCHARGE SUPPORT TEAM (NETWORK) [id number] (Maternity/NICU/Pediatrics) [phone]   [address] [place] [address] Drive [phone]   [facility] [address] [facility] [phone]   [address] [place] [address] [address] [address] [facility] [phone]   [address] [facility] [address] [phone]   [address] [place] [address] [address] [phone]

## 2023-12-03 NOTE — ASSESSMENT & PLAN NOTE
Patient on Trulicity as outpatient, last HIAA1C 5.9  Hold Trulicity, SSI coverage with [employer] [facility] while inpatient

## 2023-12-03 NOTE — CONSULTS
Consultation -  [facility]  Ebony Guthrie 59 y.o. male MRN: [id number]  Unit/Bed#: -01 Encounter: [id number]        Inpatient consult to gastroenterology  Consult performed by: Erik Ferrell MD  Consult ordered by: Jessika Mendes          ASSESSMENT/PLAN:     59-year-old gentleman, history of gastric bypass, history of cholecystectomy, presents with hematochezia, 4 days. Notable drop in hemoglobin, baseline hemoglobin on the patient's outpatient labs were 14 several months ago, 8.7 on admission. Hemodynamically stable. He has been taking Imodium. Last colonoscopy 5 years ago. 1.  Hematochezia: Most likely diverticular bleed given presentation and overall stable appearance however upper GI bleed in patient with gastric bypass is also on the differential  -Continue to follow hemoglobin, transfuse as clinically indicated  -Continue with Protonix drip  -Plan for EGD and colonoscopy tomorrow to further evaluate  -If he has any active bleeding overnight please call us and we will have him transferred to           ______________________________________________________________________    Reason for Consult / Principal Problem: GI bleed    HPI: Ebony Guthrie is a 59y.o. year old male who presents with GI bleed this is a pleasant 59-year-old gentleman, history of HIV, well-controlled, reports that his HIV has been well treated on antiretroviral therapy. Patient was in his usual state of health, has a remote history of gastric bypass. 3 days prior to admission patient had what he describes as a maroon bloody bowel movement without any associate abdominal pain, lightheaded, dizziness after seeing this he decided to take an Imodium. The next day had another similar episode and took Imodium again finally had another episode on the day of admission finally prompting admission. He denies any lightheadedness, dizziness, nausea, vomiting.   He did report over the same period from having upper abdominal pain and discomfort which she thought was gas pains and had been taking over-the-counter antacids for this. Denies any prior history peptic ulcer disease. Rarely takes Advil. Last colonoscopy 5 years ago. No prior history of GI bleed. No anticoagulation. Since admission patient had another bloody bowel movement this morning. He received 1 unit of packed red cells and has been hemodynamically stable. Blood pressures have been stable throughout his course. He had a CT scan on admission which was unremarkable other than evidence of prior gastric bypass. Medical history is notable for HIV and hypertension. Surgical history is notable for gastric bypass, cholecystectomy    Patient denies any tobacco, drinks 2-3 drinks nightly. He works in human resources. Denies family history of GI associated malignancies. Review of Systems: The remainder of the review of systems was negative except for the pertinent positives noted in HPI. Historical Information   Past Medical History:   Diagnosis Date    HIV (human immunodeficiency virus infection) ([address])     Hypertension      History reviewed. No pertinent surgical history. Social History   Social History     Substance and Sexual Activity   Alcohol Use Yes    Comment: "drink more than i should"     Social History     Substance and Sexual Activity   Drug Use Yes    Types: Marijuana     Social History     Tobacco Use   Smoking Status Never   Smokeless Tobacco Never     History reviewed. No pertinent family history.     Meds/Allergies     Medications Prior to Admission   Medication    amLODIPine (NORVASC) 5 mg tablet    atorvastatin (LIPITOR) 20 mg tablet    candesartan (ATACAND) 32 MG tablet    Dolutegravir-lamiVUDine  MG TABS    dulaglutide (Trulicity) 1.5 AP/8.0KK injection     Current Facility-Administered Medications   Medication Dose Route Frequency    ALPRAZolam (XANAX) tablet 0.25 mg  0.25 mg Oral BID PRN    amLODIPine (NORVASC) tablet 10 mg  10 mg Oral Daily    atorvastatin (LIPITOR) tablet 20 mg  20 mg Oral Daily    dolutegravir (TIVICAY) tablet 50 mg  50 mg Oral Daily    And    lamiVUDine (EPIVIR) tablet 300 mg  300 mg Oral Daily    doxylamine (UNISOM) tablet 12.5 mg  12.5 mg Oral HS PRN    insulin lispro (HumaLOG) 100 units/mL subcutaneous injection 2-12 Units  2-12 Units Subcutaneous TID AC    insulin lispro (HumaLOG) 100 units/mL subcutaneous injection 2-12 Units  2-12 Units Subcutaneous HS    multi-electrolyte (PLASMALYTE-A/ISOLYTE-S PH 7.4) IV solution  75 mL/hr Intravenous Continuous    ondansetron (ZOFRAN) injection 4 mg  4 mg Intravenous Q4H PRN    pantoprazole (PROTONIX) 80 mg in sodium chloride 0.9 % 100 mL infusion  8 mg/hr Intravenous Continuous       No Known Allergies    Objective     Blood pressure 140/84, pulse 84, temperature 97.8 °F (36.6 °C), temperature source Oral, resp. rate 18, height 6' 6" (1.981 m), weight 125 kg (275 lb), SpO2 96 %.       Intake/Output Summary (Last 24 hours) at 12/3/2023 1407  Last data filed at 12/3/2023 0758  Gross per 24 hour   Intake 1498 ml   Output 2100 ml   Net -602 ml       PHYSICAL EXAM     GEN: well nourished, well developed, no acute distress, overweight  HEENT: anicteric, MMM, no cervical or supraclavicular lymphadenopathy  CV: RRR, no m/r/g  CHEST: CTA b/l, no WRR  ABD: +BS, soft, NT/ND, no hepatosplenomegaly  EXT: no c/c/e  SKIN: no rashes,  NEURO: aaox3    Lab Results:   Admission on 12/02/2023   Component Date Value    ABO Grouping 12/02/2023 O     Rh Factor 12/02/2023 Positive     Antibody Screen 12/02/2023 Negative     Specimen Expiration Date 12/02/2023 [id number]     WBC 12/02/2023 15.96 (H)     RBC 12/02/2023 2.85 (L)     Hemoglobin 12/02/2023 8.7 (L)     Hematocrit 12/02/2023 25.9 (L)     MCV 12/02/2023 91     MCH 12/02/2023 30.5     MCHC 12/02/2023 33.6     RDW 12/02/2023 13.3     MPV 12/02/2023 9.2     Platelets 26/60/5532 247     nRBC 12/02/2023 0     Neutrophils Relative 12/02/2023 66     Immat GRANS % 12/02/2023 1     Lymphocytes Relative 12/02/2023 23     Monocytes Relative 12/02/2023 8     Eosinophils Relative 12/02/2023 1     Basophils Relative 12/02/2023 1     Neutrophils Absolute 12/02/2023 10.68 (H)     Immature Grans Absolute 12/02/2023 0.17     Lymphocytes Absolute 12/02/2023 3.70     Monocytes Absolute 12/02/2023 1.20     Eosinophils Absolute 12/02/2023 0.13     Basophils Absolute 12/02/2023 0.08     Sodium 12/02/2023 131 (L)     Potassium 12/02/2023 3.9     Chloride 12/02/2023 98     CO2 12/02/2023 21     ANION GAP 12/02/2023 12     BUN 12/02/2023 20     Creatinine 12/02/2023 0.78     Glucose 12/02/2023 176 (H)     Calcium 12/02/2023 8.9     AST 12/02/2023 16     ALT 12/02/2023 15     Alkaline Phosphatase 12/02/2023 55     Total Protein 12/02/2023 6.6     Albumin 12/02/2023 3.9     Total Bilirubin 12/02/2023 0.50     eGFR 12/02/2023 98     Protime 12/02/2023 13.5     INR 12/02/2023 1.04     PTT 12/02/2023 24     Ventricular Rate 12/02/2023 103     Atrial Rate 12/02/2023 103     RI Interval 12/02/2023 180     QRSD Interval 12/02/2023 94     QT Interval 12/02/2023 368     QTC Interval 12/02/2023 482     P Axis 12/02/2023 77     QRS Axis 12/02/2023 34     T Wave [place] 12/02/2023 71     ABO Grouping 12/02/2023 O     Rh Factor 12/02/2023 Positive     OCCULT BLD, FECAL IMMUNO* 12/02/2023 Positive (A)     Unit Product Code 12/03/2023 [id number]     Unit Number 12/03/2023 [id number]     Unit ABO 12/03/2023 O     Unit RH 12/03/2023 POS     Crossmatch 12/03/2023 Compatible     Unit Dispense Status 12/03/2023 Presumed Trans     Unit Product Volume 12/03/2023 350     Hemoglobin 12/02/2023 8.1 (L)     Hematocrit 12/02/2023 23.9 (L)     Hemoglobin 12/02/2023 8.2 (L)     Hematocrit 12/02/2023 23.9 (L)     Color, UA 12/02/2023 Yellow     Clarity, UA 12/02/2023 Clear     Specific Gravity, UA 12/02/2023 1.015     pH, UA 12/02/2023 5.5     Leukocytes, UA 12/02/2023 Negative Nitrite, UA 12/02/2023 Negative     Protein, UA 12/02/2023 Negative     Glucose, UA 12/02/2023 Negative     Ketones, UA 12/02/2023 Negative     Urobilinogen, UA 12/02/2023 0.2     Bilirubin, UA 12/02/2023 Negative     Occult Blood, UA 12/02/2023 Negative     RBC, UA 12/02/2023 None Seen     WBC, UA 12/02/2023 None Seen     Epithelial Cells 12/02/2023 Occasional     Bacteria, UA 12/02/2023 Occasional     Blood Culture 12/02/2023 Received in Microbiology Lab. Culture in Progress. Blood Culture 12/02/2023 Received in Microbiology Lab. Culture in Progress. Procalcitonin 12/02/2023 0.05     Iron Saturation 12/02/2023 23     TIBC 12/02/2023 367     Iron 12/02/2023 85     UIBC 12/02/2023 282     Ferritin 12/02/2023 106     POC Glucose 12/02/2023 145 (H)     Hemoglobin 12/03/2023 8.6 (L)     Hematocrit 12/03/2023 25.0 (L)     POC Glucose 12/02/2023 217 (H)     WBC 12/03/2023 10.48 (H)     RBC 12/03/2023 2.88 (L)     Hemoglobin 12/03/2023 8.8 (L)     Hematocrit 12/03/2023 26.1 (L)     MCV 12/03/2023 91     MCH 12/03/2023 30.6     MCHC 12/03/2023 33.7     RDW 12/03/2023 14.6     Platelets 89/40/1765 198     MPV 12/03/2023 8.9     Sodium 12/03/2023 134 (L)     Potassium 12/03/2023 3.8     Chloride 12/03/2023 100     CO2 12/03/2023 26     ANION GAP 12/03/2023 8     BUN 12/03/2023 13     Creatinine 12/03/2023 0.53 (L)     Glucose 12/03/2023 134     Calcium 12/03/2023 8.4     eGFR 12/03/2023 114     POC Glucose 12/03/2023 146 (H)     Procalcitonin 12/03/2023 <0.05     Hemoglobin 12/03/2023 8.8 (L)     Hematocrit 12/03/2023 25.6 (L)     POC Glucose 12/03/2023 127     Unit Product Code 12/03/2023 [id number]     Unit Number 12/03/2023 [id number]     Unit ABO 12/03/2023 O     Unit RH 12/03/2023 POS     Crossmatch 12/03/2023 Compatible     Unit Dispense Status 12/03/2023 Crossmatched     Unit Product Volume 12/03/2023 350      Imaging Studies: I have personally reviewed pertinent reports.

## 2023-12-03 NOTE — PLAN OF CARE
Problem: Potential for Falls  Goal: Patient will remain free of falls  Description: INTERVENTIONS:  - Educate patient/family on patient safety including physical limitations  - Instruct patient to call for assistance with activity   - Consult OT/PT to assist with strengthening/mobility   - Keep Call bell within reach  - Keep bed low and locked with side rails adjusted as appropriate  - Keep care items and personal belongings within reach  - Initiate and maintain comfort rounds  - Offer Toileting every 2 Hours, in advance of need  - Apply yellow socks and bracelet for high fall risk patients  - Consider moving patient to room near nurses station  Outcome: Progressing     Problem: PAIN - ADULT  Goal: Verbalizes/displays adequate comfort level or baseline comfort level  Description: Interventions:  - Encourage patient to monitor pain and request assistance  - Assess pain using appropriate pain scale  - Administer analgesics based on type and severity of pain and evaluate response  - Implement non-pharmacological measures as appropriate and evaluate response  - Consider cultural and social influences on pain and pain management  - Notify physician/advanced practitioner if interventions unsuccessful or patient reports new pain  Outcome: Progressing     Problem: METABOLIC, FLUID AND ELECTROLYTES - ADULT  Goal: Electrolytes maintained within normal limits  Description: INTERVENTIONS:  - Monitor labs and assess patient for signs and symptoms of electrolyte imbalances  - Administer electrolyte replacement as ordered  - Monitor response to electrolyte replacements, including repeat lab results as appropriate  - Instruct patient on fluid and nutrition as appropriate  Outcome: Progressing

## 2023-12-03 NOTE — ASSESSMENT & PLAN NOTE
SIRS criteria met on admission AEB tachycardia, leukocytosis. No obvious infectious source, likely reactive 2/2 GIB. CT a/p with no acute intra-abdominal pathology  UA benign. BC x2: Pending. Procal negative.   Hold off on antibiotics for now with no definite source  Low threshold to cover if patient develops fever or clinical deterioration given hx HIV

## 2023-12-03 NOTE — PROGRESS NOTES
[id number] [facility]  Progress Note  Name: Juanito Villarreal  MRN: [id number]  Unit/Bed#: -01 I Date of Admission: 12/2/2023   Date of Service: 12/3/2023 I Hospital Day: 1    Assessment/Plan   * GI bleed  Assessment & Plan  Presented with painless BRBPR x 3 days. Notes first episode appeared a little dark, but subsequent stool was bright red and bloody. Hx gastric bypass, diverticulosis. Suspect possible diverticular bleed vs brisk upper GIB, more likely former as bleeding ongoing ~72 hrs  No recent baseline labs, however last CBC in June 2023 shows Hgb 14.7  Hgb 8.1-8.7 on arrival to ED, s/p 1 unit PRBC on admission  FOBT +. Iron panel wnl. Pending collection of stool panel. Started on PPI gtt given hx gastri bypass  Give additional 1 unit PRBC with bloody stool, Hgb 8.8 today  GI consulted, NPO at midnight for EGD/colonoscopy tomorrow  Clear liquid diet, gentle IVFs, antiemetics  If continued bleeding or hemodynamic instability, consider transfer for higher level of care    SIRS (systemic inflammatory response syndrome) (HCC)  Assessment & Plan  SIRS criteria met on admission AEB tachycardia, leukocytosis. No obvious infectious source, likely reactive 2/2 GIB. CT a/p with no acute intra-abdominal pathology  UA benign. BC x2: Pending. Procal negative.   Hold off on antibiotics for now with no definite source  Low threshold to cover if patient develops fever or clinical deterioration given hx HIV    HIV (human immunodeficiency virus) infection ([address] )  Assessment & Plan  Patient follows with HIV doctor in [facility] which is out of network, reports adherence to daily Dovato in the outpatient setting  Admitting provider reviewed prior lab results on patient's phone to confirm baseline Hgb, but were unable to find recent CD4 count labs  Continue home doletegravir-lamivudine 50-300mg daily  Continue outpatient F/U with known HIV clinic provider    Prediabetes  Assessment & Plan  Patient on Trulicity as outpatient, last [id number] 5.9  Hold Trulicity, SSI coverage with AccuCheks ACHS while inpatient    HLD (hyperlipidemia)  Assessment & Plan  Continue home atorvastatin              VTE Pharmacologic Prophylaxis: VTE Score: 2 Low Risk (Score 0-2) - Encourage Ambulation. Mobility:   Basic Mobility Inpatient Raw Score: 24  JH-HLM Goal: 8: Walk 250 feet or more  JH-HLM Achieved: 7: Walk 25 feet or more  HLM Goal achieved. Continue to encourage appropriate mobility. Patient Centered Rounds: I performed bedside rounds with nursing staff today. Discussions with Specialists or Other Care Team Provider: GI    Education and Discussions with Family / Patient: Patient declined call to . Total Time Spent on Date of Encounter in care of patient: 35 mins. This time was spent on one or more of the following: performing physical exam; counseling and coordination of care; obtaining or reviewing history; documenting in the medical record; reviewing/ordering tests, medications or procedures; communicating with other healthcare professionals and discussing with patient's family/caregivers. Current Length of Stay: 1 day(s)  Current Patient Status: Inpatient   Certification Statement: The patient will continue to require additional inpatient hospital stay due to anemia, EGD/colonoscopy  Discharge Plan: Anticipate discharge in 48 hrs to home. Code Status: Level 1 - Full Code    Subjective:   Patient is seen at bedside this a.m., reports having bloody stool this a.m. with mild fatigue. Denies nausea, abdominal pain, vomiting, active bleeding otherwise. Objective:     Vitals:   Temp (24hrs), Av °F (36.7 °C), Min:97.7 °F (36.5 °C), Max:98.3 °F (36.8 °C)    Temp:  [97.7 °F (36.5 °C)-98.3 °F (36.8 °C)] 97.8 °F (36.6 °C)  HR:  [] 84  Resp:  [18-20] 18  BP: (122-151)/(64-84) 140/84  SpO2:  [94 %-98 %] 96 %  Body mass index is 31.78 kg/m².      Input and Output Summary (last 24 hours): Intake/Output Summary (Last 24 hours) at 12/3/2023 1213  Last data filed at 12/3/2023 0758  Gross per 24 hour   Intake 1498 ml   Output 2100 ml   Net -602 ml       Physical Exam:   Physical Exam  Constitutional:       General: He is not in acute distress. Appearance: He is not ill-appearing, toxic-appearing or diaphoretic. Cardiovascular:      Rate and Rhythm: Normal rate and regular rhythm. Pulses: Normal pulses. Heart sounds: Normal heart sounds. Pulmonary:      Effort: Pulmonary effort is normal. No respiratory distress. Breath sounds: Normal breath sounds. Abdominal:      General: Bowel sounds are normal. There is no distension. Palpations: Abdomen is soft. Tenderness: There is no abdominal tenderness. Musculoskeletal:         General: No swelling or tenderness. Skin:     General: Skin is warm. Coloration: Skin is not pale. Neurological:      General: No focal deficit present. Mental Status: He is alert. Psychiatric:         Mood and Affect: Mood normal.         Behavior: Behavior normal.          Additional Data:     Labs:  Results from last 7 days   Lab Units 12/03/23  1130 12/03/23  0614 12/02/23  1508 12/02/23  0921   WBC Thousand/uL  --  10.48*  --  15.96*   HEMOGLOBIN g/dL 8.8* 8.8*   < > 8.7*   HEMATOCRIT % 25.6* 26.1*   < > 25.9*   PLATELETS Thousands/uL  --  198  --  247   NEUTROS PCT %  --   --   --  66   LYMPHS PCT %  --   --   --  23   MONOS PCT %  --   --   --  8   EOS PCT %  --   --   --  1    < > = values in this interval not displayed.      Results from last 7 days   Lab Units 12/03/23  0614 12/02/23  0921   SODIUM mmol/L 134* 131*   POTASSIUM mmol/L 3.8 3.9   CHLORIDE mmol/L 100 98   CO2 mmol/L 26 21   BUN mg/dL 13 20   CREATININE mg/dL 0.53* 0.78   ANION GAP mmol/L 8 12   CALCIUM mg/dL 8.4 8.9   ALBUMIN g/dL  --  3.9   TOTAL BILIRUBIN mg/dL  --  0.50   ALK PHOS U/L  --  55   ALT U/L  --  15   AST U/L  --  16   GLUCOSE RANDOM mg/dL 134 176* Results from last 7 days   Lab Units 12/02/23  0921   INR  1.04     Results from last 7 days   Lab Units 12/03/23  1109 12/03/23  0711 12/02/23  2036 12/02/23  1808   POC GLUCOSE mg/dl 127 146* 217* 145*         Results from last 7 days   Lab Units 12/03/23  0614 12/02/23  0921   PROCALCITONIN ng/ml <0.05 0.05       Lines/Drains:  Invasive Devices       Peripheral Intravenous Line  Duration             Peripheral IV 12/02/23 Left Arm 1 day    Peripheral IV 12/02/23 Distal;Right;Ventral (anterior) Forearm <1 day                          Imaging: Reviewed radiology reports from this admission including: abdominal/pelvic CT    Recent Cultures (last 7 days):   Results from last 7 days   Lab Units 12/02/23  1832 12/02/23  1821   BLOOD CULTURE  Received in Microbiology Lab. Culture in Progress. Received in Microbiology Lab. Culture in Progress. Last 24 Hours Medication List:   Current Facility-Administered Medications   Medication Dose Route Frequency Provider Last Rate    ALPRAZolam  0.25 mg Oral BID PRN Christ Condon PA-C      amLODIPine  10 mg Oral Daily Twylla Em      atorvastatin  20 mg Oral Daily Twylla Em      dolutegravir  50 mg Oral Daily Twylla Em      And    lamiVUDine  300 mg Oral Daily Twylla Em      doxylamine  12.5 mg Oral HS PRN Christ Condon PA-C      insulin lispro  2-12 Units Subcutaneous TID AC Abdi Anthony Rodriguez      insulin lispro  2-12 Units Subcutaneous HS Twylla Em      multi-electrolyte  75 mL/hr Intravenous Continuous Twylla Em 75 mL/hr (12/03/23 1011)    ondansetron  4 mg Intravenous Q4H PRN Abdi Anthony Rodriguez      pantoprazole (PROTONIX) 80 mg in sodium chloride 0.9 % 100 mL infusion  8 mg/hr Intravenous Continuous Twylla Em 8 mg/hr (12/03/23 0216)        Today, Patient Was Seen By: Wilber Robin PA-C    **Please Note: This note may have been constructed using a voice recognition system. **

## 2023-12-03 NOTE — UTILIZATION REVIEW
NOTIFICATION OF INPATIENT ADMISSION   AUTHORIZATION REQUEST   SERVICING FACILITY:   [address] [place] [address]  [facility], [address] [place] [address]  Tax ID: [id number]  NPI: [id number] ATTENDING PROVIDER:  Attending Name and NPI#: Saad White [[id number]]  Address: [address] [place] [address], [address] [place] [address]  Phone:  [phone]     ADMISSION INFORMATION:  Place of Service: Inpatient [address] [facility]  Place of Service Code: 21  Inpatient Admission Date/Time: 12/2/23  3:10 PM  Discharge Date/Time: No discharge date for patient encounter. Admitting Diagnosis Code/Description:  Hyponatremia [E87.1]  Leukocytosis [D72.829]  GI bleed [K92.2]  Rectal bleed [K62.5]     UTILIZATION REVIEW CONTACT:  Romayne Sinks, Utilization   Network Utilization Review Department  Phone: [phone]  Fax: [phone]  Email: [email or web address]  Contact for approvals/pending authorizations, clinical reviews, and discharge. PHYSICIAN ADVISORY SERVICES:  Medical Necessity Denial & [employer] Review  Phone: [phone]  Fax: [phone]  Email: [email or web address]     DISCHARGE SUPPORT TEAM:  For Patients Discharge Needs & Updates  Phone: [phone] opt. 2 Fax: [phone]  Email: [email or web address]

## 2023-12-03 NOTE — ASSESSMENT & PLAN NOTE
Patient follows with HIV doctor in [facility] which is out of network, reports adherence to daily Dovato in the outpatient setting  Admitting provider reviewed prior lab results on patient's phone to confirm baseline Hgb, but were unable to find recent CD4 count labs  Continue home doletegravir-lamivudine 50-300mg daily  Continue outpatient F/U with known HIV clinic provider

## 2023-12-03 NOTE — PLAN OF CARE
Problem: PAIN - ADULT  Goal: Verbalizes/displays adequate comfort level or baseline comfort level  Description: Interventions:  - Encourage patient to monitor pain and request assistance  - Assess pain using appropriate pain scale  - Administer analgesics based on type and severity of pain and evaluate response  - Implement non-pharmacological measures as appropriate and evaluate response  - Consider cultural and social influences on pain and pain management  - Notify physician/advanced practitioner if interventions unsuccessful or patient reports new pain  Outcome: Progressing         Problem: METABOLIC, FLUID AND ELECTROLYTES - ADULT  Goal: Electrolytes maintained within normal limits  Description: INTERVENTIONS:  - Monitor labs and assess patient for signs and symptoms of electrolyte imbalances  - Administer electrolyte replacement as ordered  - Monitor response to electrolyte replacements, including repeat lab results as appropriate  - Instruct patient on fluid and nutrition as appropriate  Outcome: Progressing  Goal: Fluid balance maintained  Description: INTERVENTIONS:  - Monitor labs   - Monitor I/O and WT  - Instruct patient on fluid and nutrition as appropriate  - Assess for signs & symptoms of volume excess or deficit  Outcome: Progressing  Goal: Glucose maintained within target range  Description: INTERVENTIONS:  - Monitor Blood Glucose as ordered  - Assess for signs and symptoms of hyperglycemia and hypoglycemia  - Administer ordered medications to maintain glucose within target range  - Assess nutritional intake and initiate nutrition service referral as needed  Outcome: Progressing     Problem: HEMATOLOGIC - ADULT  Goal: Maintains hematologic stability  Description: INTERVENTIONS  - Assess for signs and symptoms of bleeding or hemorrhage  - Monitor labs  - Administer supportive blood products/factors as ordered and appropriate  Outcome: Progressing     Problem: Knowledge Deficit  Goal: Patient/family/caregiver demonstrates understanding of disease process, treatment plan, medications, and discharge instructions  Description: Complete learning assessment and assess knowledge base.   Interventions:  - Provide teaching at level of understanding  - Provide teaching via preferred learning methods  12/2/2023 2212 by Fracisco Mccoy RN  Outcome: Progressing  12/2/2023 2211 by Fracisco Mccoy RN  Outcome: Progressing

## 2023-12-03 NOTE — ASSESSMENT & PLAN NOTE
Presented with painless BRBPR x 3 days. Notes first episode appeared a little dark, but subsequent stool was bright red and bloody. Hx gastric bypass, diverticulosis. Suspect possible diverticular bleed vs brisk upper GIB, more likely former as bleeding ongoing ~72 hrs  No recent baseline labs, however last CBC in June 2023 shows Hgb 14.7  Hgb 8.1-8.7 on arrival to ED, s/p 1 unit PRBC on admission  FOBT +. Iron panel wnl. Pending collection of stool panel.    Started on PPI gtt given hx gastri bypass  Give additional 1 unit PRBC with bloody stool, Hgb 8.8 today  GI consulted, NPO at midnight for EGD/colonoscopy tomorrow  Clear liquid diet, gentle IVFs, antiemetics  If continued bleeding or hemodynamic instability, consider transfer for higher level of care

## 2023-12-04 ENCOUNTER — APPOINTMENT (INPATIENT)
Dept: GASTROENTEROLOGY | Facility: HOSPITAL | Age: 58
DRG: 378 | End: 2023-12-04
Attending: INTERNAL MEDICINE
Payer: COMMERCIAL

## 2023-12-04 ENCOUNTER — ANESTHESIA (INPATIENT)
Dept: GASTROENTEROLOGY | Facility: HOSPITAL | Age: 58
DRG: 378 | End: 2023-12-04
Payer: COMMERCIAL

## 2023-12-04 ENCOUNTER — ANESTHESIA EVENT (INPATIENT)
Dept: GASTROENTEROLOGY | Facility: HOSPITAL | Age: 58
DRG: 378 | End: 2023-12-04
Payer: COMMERCIAL

## 2023-12-04 LAB
ABO GROUP BLD BPU: NORMAL
ANION GAP SERPL CALCULATED.3IONS-SCNC: 8 MMOL/L
BPU ID: NORMAL
BUN SERPL-MCNC: 7 MG/DL (ref 5–25)
CALCIUM SERPL-MCNC: 8.5 MG/DL (ref 8.4–10.2)
CAMPYLOBACTER DNA SPEC NAA+PROBE: NORMAL
CHLORIDE SERPL-SCNC: 102 MMOL/L (ref 96–108)
CO2 SERPL-SCNC: 26 MMOL/L (ref 21–32)
CREAT SERPL-MCNC: 0.53 MG/DL (ref 0.6–1.3)
CROSSMATCH: NORMAL
ERYTHROCYTE [DISTWIDTH] IN BLOOD BY AUTOMATED COUNT: 14.6 % (ref 11.6–15.1)
GFR SERPL CREATININE-BSD FRML MDRD: 114 ML/MIN/1.73SQ M
GLUCOSE SERPL-MCNC: 105 MG/DL (ref 65–140)
GLUCOSE SERPL-MCNC: 116 MG/DL (ref 65–140)
GLUCOSE SERPL-MCNC: 120 MG/DL (ref 65–140)
GLUCOSE SERPL-MCNC: 125 MG/DL (ref 65–140)
GLUCOSE SERPL-MCNC: 132 MG/DL (ref 65–140)
HCT VFR BLD AUTO: 24.9 % (ref 36.5–49.3)
HCT VFR BLD AUTO: 27.1 % (ref 36.5–49.3)
HGB BLD-MCNC: 8.3 G/DL (ref 12–17)
HGB BLD-MCNC: 9.1 G/DL (ref 12–17)
MCH RBC QN AUTO: 30.5 PG (ref 26.8–34.3)
MCHC RBC AUTO-ENTMCNC: 33.6 G/DL (ref 31.4–37.4)
MCV RBC AUTO: 91 FL (ref 82–98)
PLATELET # BLD AUTO: 227 THOUSANDS/UL (ref 149–390)
PMV BLD AUTO: 8.8 FL (ref 8.9–12.7)
POTASSIUM SERPL-SCNC: 3.7 MMOL/L (ref 3.5–5.3)
RBC # BLD AUTO: 2.98 MILLION/UL (ref 3.88–5.62)
SALMONELLA DNA SPEC QL NAA+PROBE: NORMAL
SHIGA TOXIN STX GENE SPEC NAA+PROBE: NORMAL
SHIGELLA DNA SPEC QL NAA+PROBE: NORMAL
SODIUM SERPL-SCNC: 136 MMOL/L (ref 135–147)
UNIT DISPENSE STATUS: NORMAL
UNIT PRODUCT CODE: NORMAL
UNIT PRODUCT VOLUME: 350 ML
UNIT RH: NORMAL
WBC # BLD AUTO: 8.3 THOUSAND/UL (ref 4.31–10.16)

## 2023-12-04 PROCEDURE — C9113 INJ PANTOPRAZOLE SODIUM, VIA: HCPCS | Performed by: STUDENT IN AN ORGANIZED HEALTH CARE EDUCATION/TRAINING PROGRAM

## 2023-12-04 PROCEDURE — 43239 EGD BIOPSY SINGLE/MULTIPLE: CPT | Performed by: INTERNAL MEDICINE

## 2023-12-04 PROCEDURE — 82948 REAGENT STRIP/BLOOD GLUCOSE: CPT

## 2023-12-04 PROCEDURE — 88305 TISSUE EXAM BY PATHOLOGIST: CPT | Performed by: STUDENT IN AN ORGANIZED HEALTH CARE EDUCATION/TRAINING PROGRAM

## 2023-12-04 PROCEDURE — 85018 HEMOGLOBIN: CPT | Performed by: STUDENT IN AN ORGANIZED HEALTH CARE EDUCATION/TRAINING PROGRAM

## 2023-12-04 PROCEDURE — 0DJD8ZZ INSPECTION OF LOWER INTESTINAL TRACT, VIA NATURAL OR ARTIFICIAL OPENING ENDOSCOPIC: ICD-10-PCS | Performed by: INTERNAL MEDICINE

## 2023-12-04 PROCEDURE — 45378 DIAGNOSTIC COLONOSCOPY: CPT | Performed by: INTERNAL MEDICINE

## 2023-12-04 PROCEDURE — 85027 COMPLETE CBC AUTOMATED: CPT | Performed by: PHYSICIAN ASSISTANT

## 2023-12-04 PROCEDURE — 0DB68ZX EXCISION OF STOMACH, VIA NATURAL OR ARTIFICIAL OPENING ENDOSCOPIC, DIAGNOSTIC: ICD-10-PCS | Performed by: INTERNAL MEDICINE

## 2023-12-04 PROCEDURE — 85014 HEMATOCRIT: CPT | Performed by: STUDENT IN AN ORGANIZED HEALTH CARE EDUCATION/TRAINING PROGRAM

## 2023-12-04 PROCEDURE — 80048 BASIC METABOLIC PNL TOTAL CA: CPT | Performed by: PHYSICIAN ASSISTANT

## 2023-12-04 PROCEDURE — 99232 SBSQ HOSP IP/OBS MODERATE 35: CPT | Performed by: PHYSICIAN ASSISTANT

## 2023-12-04 RX ORDER — KETAMINE HCL IN NACL, ISO-OSM 100MG/10ML
SYRINGE (ML) INJECTION AS NEEDED
Status: DISCONTINUED | OUTPATIENT
Start: 2023-12-04 | End: 2023-12-04

## 2023-12-04 RX ORDER — LIDOCAINE HYDROCHLORIDE 10 MG/ML
INJECTION, SOLUTION EPIDURAL; INFILTRATION; INTRACAUDAL; PERINEURAL AS NEEDED
Status: DISCONTINUED | OUTPATIENT
Start: 2023-12-04 | End: 2023-12-04

## 2023-12-04 RX ORDER — POLYETHYLENE GLYCOL 3350 17 G/17G
238 POWDER, FOR SOLUTION ORAL ONCE
Status: DISCONTINUED | OUTPATIENT
Start: 2023-12-04 | End: 2023-12-04

## 2023-12-04 RX ORDER — PROPOFOL 10 MG/ML
INJECTION, EMULSION INTRAVENOUS AS NEEDED
Status: DISCONTINUED | OUTPATIENT
Start: 2023-12-04 | End: 2023-12-04

## 2023-12-04 RX ORDER — SODIUM CHLORIDE, SODIUM LACTATE, POTASSIUM CHLORIDE, CALCIUM CHLORIDE 600; 310; 30; 20 MG/100ML; MG/100ML; MG/100ML; MG/100ML
INJECTION, SOLUTION INTRAVENOUS CONTINUOUS PRN
Status: DISCONTINUED | OUTPATIENT
Start: 2023-12-04 | End: 2023-12-04

## 2023-12-04 RX ORDER — PANTOPRAZOLE SODIUM 40 MG/1
40 TABLET, DELAYED RELEASE ORAL
Status: DISCONTINUED | OUTPATIENT
Start: 2023-12-05 | End: 2023-12-05 | Stop reason: HOSPADM

## 2023-12-04 RX ADMIN — Medication 20 MG: at 13:31

## 2023-12-04 RX ADMIN — PROPOFOL 50 MG: 10 INJECTION, EMULSION INTRAVENOUS at 13:49

## 2023-12-04 RX ADMIN — PROPOFOL 50 MG: 10 INJECTION, EMULSION INTRAVENOUS at 13:58

## 2023-12-04 RX ADMIN — LAMIVUDINE 300 MG: 150 TABLET, FILM COATED ORAL at 08:42

## 2023-12-04 RX ADMIN — PROPOFOL 50 MG: 10 INJECTION, EMULSION INTRAVENOUS at 13:38

## 2023-12-04 RX ADMIN — PROPOFOL 50 MG: 10 INJECTION, EMULSION INTRAVENOUS at 13:44

## 2023-12-04 RX ADMIN — PROPOFOL 50 MG: 10 INJECTION, EMULSION INTRAVENOUS at 13:53

## 2023-12-04 RX ADMIN — PROPOFOL 50 MG: 10 INJECTION, EMULSION INTRAVENOUS at 13:33

## 2023-12-04 RX ADMIN — SODIUM CHLORIDE, SODIUM GLUCONATE, SODIUM ACETATE, POTASSIUM CHLORIDE AND MAGNESIUM CHLORIDE 75 ML/HR: 526; 502; 368; 37; 30 INJECTION, SOLUTION INTRAVENOUS at 05:56

## 2023-12-04 RX ADMIN — Medication 10 MG: at 13:44

## 2023-12-04 RX ADMIN — PROPOFOL 50 MG: 10 INJECTION, EMULSION INTRAVENOUS at 13:46

## 2023-12-04 RX ADMIN — ALPRAZOLAM 0.25 MG: 0.25 TABLET ORAL at 21:30

## 2023-12-04 RX ADMIN — DOLUTEGRAVIR SODIUM 50 MG: 50 TABLET, FILM COATED ORAL at 08:42

## 2023-12-04 RX ADMIN — SODIUM CHLORIDE 8 MG/HR: 9 INJECTION, SOLUTION INTRAVENOUS at 00:09

## 2023-12-04 RX ADMIN — AMLODIPINE BESYLATE 10 MG: 10 TABLET ORAL at 08:42

## 2023-12-04 RX ADMIN — SODIUM CHLORIDE, SODIUM LACTATE, POTASSIUM CHLORIDE, AND CALCIUM CHLORIDE: .6; .31; .03; .02 INJECTION, SOLUTION INTRAVENOUS at 13:28

## 2023-12-04 RX ADMIN — ATORVASTATIN CALCIUM 20 MG: 20 TABLET, FILM COATED ORAL at 08:42

## 2023-12-04 RX ADMIN — LIDOCAINE HYDROCHLORIDE 100 MG: 10 INJECTION, SOLUTION EPIDURAL; INFILTRATION; INTRACAUDAL at 13:31

## 2023-12-04 RX ADMIN — PROPOFOL 50 MG: 10 INJECTION, EMULSION INTRAVENOUS at 13:40

## 2023-12-04 RX ADMIN — PROPOFOL 150 MG: 10 INJECTION, EMULSION INTRAVENOUS at 13:31

## 2023-12-04 RX ADMIN — DOXYLAMINE SUCCINATE 12.5 MG: 25 TABLET ORAL at 21:51

## 2023-12-04 RX ADMIN — PROPOFOL 50 MG: 10 INJECTION, EMULSION INTRAVENOUS at 13:35

## 2023-12-04 RX ADMIN — PROPOFOL 50 MG: 10 INJECTION, EMULSION INTRAVENOUS at 13:42

## 2023-12-04 NOTE — ANESTHESIA PREPROCEDURE EVALUATION
Procedure:  COLONOSCOPY  EGD    Relevant Problems   CARDIO   (+) HLD (hyperlipidemia)      GI/HEPATIC   (+) GI bleed        Physical Exam    Airway    Mallampati score: III  TM Distance: >3 FB  Neck ROM: full     Dental   No notable dental hx     Cardiovascular  Cardiovascular exam normal    Pulmonary  Pulmonary exam normal     Other Findings        Anesthesia Plan  ASA Score- 2     Anesthesia Type- IV sedation with anesthesia with ASA Monitors. Additional Monitors:     Airway Plan:            Plan Factors-Exercise tolerance (METS): >4 METS. Chart reviewed. Patient summary reviewed. Patient is not a current smoker. Induction- intravenous. Postoperative Plan-     Informed Consent- Anesthetic plan and risks discussed with patient.

## 2023-12-04 NOTE — PLAN OF CARE
Problem: METABOLIC, FLUID AND ELECTROLYTES - ADULT  Goal: Fluid balance maintained  Description: INTERVENTIONS:  - Monitor labs   - Monitor I/O and WT  - Instruct patient on fluid and nutrition as appropriate  - Assess for signs & symptoms of volume excess or deficit  Outcome: Progressing     Problem: HEMATOLOGIC - ADULT  Goal: Maintains hematologic stability  Description: INTERVENTIONS  - Assess for signs and symptoms of bleeding or hemorrhage  - Monitor labs  - Administer supportive blood products/factors as ordered and appropriate  Outcome: Progressing

## 2023-12-04 NOTE — ASSESSMENT & PLAN NOTE
Patient follows with HIV doctor in [facility] which is out of network, reports adherence to daily Dovato in the outpatient setting  Admitting provider reviewed prior lab results on patient's phone to confirm baseline Hgb, but were unable to find recent CD4 count labs  Continue home doletegravir-lamivudine 50-300mg daily  Continue outpatient F/U with known HIV clinic provider

## 2023-12-04 NOTE — ASSESSMENT & PLAN NOTE
Patient on Trulicity as outpatient, last AYCM4I 5.9  Hold Trulicity, SSI coverage with AccuCheks ACHS while inpatient

## 2023-12-04 NOTE — INTERVAL H&P NOTE
H&P reviewed. After examining the patient I find no changes in the patients condition since the H&P had been written.     Vitals:    12/04/23 0756   BP: 140/74   Pulse: 78   Resp: 18   Temp: 97.8 °F (36.6 °C)   SpO2: 98%

## 2023-12-04 NOTE — ASSESSMENT & PLAN NOTE
SIRS criteria met on admission AEB tachycardia, leukocytosis. No obvious infectious source, likely reactive 2/2 GIB. CT a/p with no acute intra-abdominal pathology  UA benign. BC x2: NGTD (24 hrs). Procal negative x2.   Hold off on antibiotics for now with no definite source  Low threshold to cover if patient develops fever or clinical deterioration given hx HIV

## 2023-12-04 NOTE — PLAN OF CARE
Problem: Potential for Falls  Goal: Patient will remain free of falls  Description: INTERVENTIONS:  - Educate patient/family on patient safety including physical limitations  - Instruct patient to call for assistance with activity   - Consult OT/PT to assist with strengthening/mobility   - Keep Call bell within reach  - Keep bed low and locked with side rails adjusted as appropriate  - Keep care items and personal belongings within reach  - Initiate and maintain comfort rounds  - Make Fall Risk Sign visible to staff  - Offer Toileting every 2 Hours, in advance of need  - Initiate/Maintain alarm  - Obtain necessary fall risk management equipment:   - Apply yellow socks and bracelet for high fall risk patients  - Consider moving patient to room near nurses station  Outcome: Progressing     Problem: PAIN - ADULT  Goal: Verbalizes/displays adequate comfort level or baseline comfort level  Description: Interventions:  - Encourage patient to monitor pain and request assistance  - Assess pain using appropriate pain scale  - Administer analgesics based on type and severity of pain and evaluate response  - Implement non-pharmacological measures as appropriate and evaluate response  - Consider cultural and social influences on pain and pain management  - Notify physician/advanced practitioner if interventions unsuccessful or patient reports new pain  Outcome: Progressing     Problem: INFECTION - ADULT  Goal: Absence or prevention of progression during hospitalization  Description: INTERVENTIONS:  - Assess and monitor for signs and symptoms of infection  - Monitor lab/diagnostic results  - Monitor all insertion sites, i.e. indwelling lines, tubes, and drains  - Monitor endotracheal if appropriate and nasal secretions for changes in amount and color  - [place] appropriate cooling/warming therapies per order  - Administer medications as ordered  - Instruct and encourage patient and family to use good hand hygiene technique  - Identify and instruct in appropriate isolation precautions for identified infection/condition  Outcome: Progressing     Problem: SAFETY ADULT  Goal: Patient will remain free of falls  Description: INTERVENTIONS:  - Educate patient/family on patient safety including physical limitations  - Instruct patient to call for assistance with activity   - Consult OT/PT to assist with strengthening/mobility   - Keep Call bell within reach  - Keep bed low and locked with side rails adjusted as appropriate  - Keep care items and personal belongings within reach  - Initiate and maintain comfort rounds  - Make Fall Risk Sign visible to staff  - Offer Toileting every 2 Hours, in advance of need  - Initiate/Maintain alarm  - Obtain necessary fall risk management equipment:   - Apply yellow socks and bracelet for high fall risk patients  - Consider moving patient to room near nurses station  Outcome: Progressing  Goal: Maintain or return to baseline ADL function  Description: INTERVENTIONS:  -  Assess patient's ability to carry out ADLs; assess patient's baseline for ADL function and identify physical deficits which impact ability to perform ADLs (bathing, care of mouth/teeth, toileting, grooming, dressing, etc.)  - Assess/evaluate cause of self-care deficits   - Assess range of motion  - Assess patient's mobility; develop plan if impaired  - Assess patient's need for assistive devices and provide as appropriate  - Encourage maximum independence but intervene and supervise when necessary  - Involve family in performance of ADLs  - Assess for home care needs following discharge   - Consider OT consult to assist with ADL evaluation and planning for discharge  - Provide patient education as appropriate  Outcome: Progressing  Goal: Maintains/Returns to pre admission functional level  Description: INTERVENTIONS:  - Perform AM-PAC 6 Click Basic Mobility/ Daily Activity assessment daily.  - Set and communicate daily mobility goal to care team and patient/family/caregiver. - Collaborate with rehabilitation services on mobility goals if consulted  - Perform Range of Motion 4 times a day. - Reposition patient every 2 hours. - Dangle patient 3 times a day  - Stand patient 3 times a day  - Ambulate patient 3 times a day  - Out of bed to chair 3 times a day   - Out of bed for meals 3 times a day  - Out of bed for toileting  - Record patient progress and toleration of activity level   Outcome: Progressing     Problem: DISCHARGE PLANNING  Goal: Discharge to home or other facility with appropriate resources  Description: INTERVENTIONS:  - Identify barriers to discharge w/patient and caregiver  - Arrange for needed discharge resources and transportation as appropriate  - Identify discharge learning needs (meds, wound care, etc.)  - Arrange for interpretive services to assist at discharge as needed  - Refer to Case Management Department for coordinating discharge planning if the patient needs post-hospital services based on physician/advanced practitioner order or complex needs related to functional status, cognitive ability, or social support system  Outcome: Progressing     Problem: Knowledge Deficit  Goal: Patient/family/caregiver demonstrates understanding of disease process, treatment plan, medications, and discharge instructions  Description: Complete learning assessment and assess knowledge base.   Interventions:  - Provide teaching at level of understanding  - Provide teaching via preferred learning methods  Outcome: Progressing     Problem: METABOLIC, FLUID AND ELECTROLYTES - ADULT  Goal: Electrolytes maintained within normal limits  Description: INTERVENTIONS:  - Monitor labs and assess patient for signs and symptoms of electrolyte imbalances  - Administer electrolyte replacement as ordered  - Monitor response to electrolyte replacements, including repeat lab results as appropriate  - Instruct patient on fluid and nutrition as appropriate  Outcome: Progressing  Goal: Fluid balance maintained  Description: INTERVENTIONS:  - Monitor labs   - Monitor I/O and WT  - Instruct patient on fluid and nutrition as appropriate  - Assess for signs & symptoms of volume excess or deficit  Outcome: Progressing  Goal: Glucose maintained within target range  Description: INTERVENTIONS:  - Monitor Blood Glucose as ordered  - Assess for signs and symptoms of hyperglycemia and hypoglycemia  - Administer ordered medications to maintain glucose within target range  - Assess nutritional intake and initiate nutrition service referral as needed  Outcome: Progressing     Problem: HEMATOLOGIC - ADULT  Goal: Maintains hematologic stability  Description: INTERVENTIONS  - Assess for signs and symptoms of bleeding or hemorrhage  - Monitor labs  - Administer supportive blood products/factors as ordered and appropriate  Outcome: Progressing

## 2023-12-04 NOTE — PROGRESS NOTES
[facility]  Progress Note  Name: Claudine Ordaz  MRN: [id number]  Unit/Bed#: -01 I Date of Admission: 12/2/2023   Date of Service: 12/4/2023 I Hospital Day: 2    Assessment/Plan   * GI bleed  Assessment & Plan  Presented with painless BRBPR x 3 days. Notes first episode appeared a little dark, but subsequent stool was bright red and bloody. Hx gastric bypass, diverticulosis. Suspect possible diverticular bleed vs brisk upper GIB  Last CBC in June 2023 shows Hgb 14.7, no recent outpatient labs  Enteric stool panel pending. FOBT +. Iron panel wnl. Hgb 8.1-8.7 on arrival to ED, s/p total 2 units PRBCs during admission  PPI gtt given hx gastric bypass, likely switch to scheduled PPI pending EGD  Hgb improved at 9.1 today, no further BRBPR & no vomiting episodes  GI following, plan for EGD/colonoscopy today to assess for GIB source    SIRS (systemic inflammatory response syndrome) ([address] )  Assessment & Plan  SIRS criteria met on admission AEB tachycardia, leukocytosis. No obvious infectious source, likely reactive 2/2 GIB. CT a/p with no acute intra-abdominal pathology  UA benign. BC x2: NGTD (24 hrs). Procal negative x2.   Hold off on antibiotics for now with no definite source  Low threshold to cover if patient develops fever or clinical deterioration given hx HIV    HIV (human immunodeficiency virus) infection ([address] )  Assessment & Plan  Patient follows with HIV doctor in [facility] which is out of network, reports adherence to daily Dovato in the outpatient setting  Admitting provider reviewed prior lab results on patient's phone to confirm baseline Hgb, but were unable to find recent CD4 count labs  Continue home doletegravir-lamivudine 50-300mg daily  Continue outpatient F/U with known HIV clinic provider    Prediabetes  Assessment & Plan  Patient on Trulicity as outpatient, last DWOH7Y 5.9  Hold Trulicity, SSI coverage with AccuCheks ACHS while inpatient    HLD (hyperlipidemia)  Assessment & Plan  Continue home atorvastatin                VTE Pharmacologic Prophylaxis: VTE Score: 2 Low Risk (Score 0-2) - Encourage Ambulation. Mobility:   Basic Mobility Inpatient Raw Score: 24  JH-HLM Goal: 8: Walk 250 feet or more  JH-HLM Achieved: 8: Walk 250 feet ot more  HLM Goal achieved. Continue to encourage appropriate mobility. Patient Centered Rounds: I performed bedside rounds with nursing staff today. Discussions with Specialists or Other Care Team Provider: GI, case management    Education and Discussions with Family / Patient: Patient declined call to . Total Time Spent on Date of Encounter in care of patient: 35 mins. This time was spent on one or more of the following: performing physical exam; counseling and coordination of care; obtaining or reviewing history; documenting in the medical record; reviewing/ordering tests, medications or procedures; communicating with other healthcare professionals and discussing with patient's family/caregivers. Current Length of Stay: 2 day(s)  Current Patient Status: Inpatient   Certification Statement: The patient will continue to require additional inpatient hospital stay due to anemia, EGD/colonoscopy results  Discharge Plan: Anticipate discharge in 24-48 hrs to home. Code Status: Level 1 - Full Code    Subjective:   Patient is seen at bedside this a.m., denies abdominal pain, nausea/vomiting, no further BM since yesterday with no rectal or active bleed. Objective:     Vitals:   Temp (24hrs), Av.3 °F (36.8 °C), Min:97.7 °F (36.5 °C), Max:99 °F (37.2 °C)    Temp:  [97.7 °F (36.5 °C)-99 °F (37.2 °C)] 97.8 °F (36.6 °C)  HR:  [74-93] 78  Resp:  [18-19] 18  BP: (126-149)/(74-89) 140/74  SpO2:  [95 %-99 %] 98 %  Body mass index is 31.78 kg/m². Input and Output Summary (last 24 hours):      Intake/Output Summary (Last 24 hours) at 2023 1310  Last data filed at 2023 0009  Gross per 24 hour Intake 1642.92 ml   Output --   Net 1642.92 ml       Physical Exam:   Physical Exam  Constitutional:       General: He is not in acute distress. Appearance: He is not ill-appearing, toxic-appearing or diaphoretic. Cardiovascular:      Rate and Rhythm: Normal rate and regular rhythm. Pulses: Normal pulses. Heart sounds: Normal heart sounds. Pulmonary:      Effort: Pulmonary effort is normal. No respiratory distress. Breath sounds: Normal breath sounds. Abdominal:      General: Bowel sounds are normal. There is no distension. Palpations: Abdomen is soft. Tenderness: There is no abdominal tenderness. Musculoskeletal:         General: No swelling or tenderness. Skin:     General: Skin is warm. Coloration: Skin is not pale. Neurological:      General: No focal deficit present. Mental Status: He is alert. Psychiatric:         Mood and Affect: Mood normal.         Behavior: Behavior normal.          Additional Data:     Labs:  Results from last 7 days   Lab Units 12/04/23  0524 12/02/23  1508 12/02/23  0921   WBC Thousand/uL 8.30   < > 15.96*   HEMOGLOBIN g/dL 9.1*   < > 8.7*   HEMATOCRIT % 27.1*   < > 25.9*   PLATELETS Thousands/uL 227   < > 247   NEUTROS PCT %  --   --  66   LYMPHS PCT %  --   --  23   MONOS PCT %  --   --  8   EOS PCT %  --   --  1    < > = values in this interval not displayed. Results from last 7 days   Lab Units 12/04/23  0524 12/03/23  0614 12/02/23  0921   SODIUM mmol/L 136   < > 131*   POTASSIUM mmol/L 3.7   < > 3.9   CHLORIDE mmol/L 102   < > 98   CO2 mmol/L 26   < > 21   BUN mg/dL 7   < > 20   CREATININE mg/dL 0.53*   < > 0.78   ANION GAP mmol/L 8   < > 12   CALCIUM mg/dL 8.5   < > 8.9   ALBUMIN g/dL  --   --  3.9   TOTAL BILIRUBIN mg/dL  --   --  0.50   ALK PHOS U/L  --   --  55   ALT U/L  --   --  15   AST U/L  --   --  16   GLUCOSE RANDOM mg/dL 132   < > 176*    < > = values in this interval not displayed.      Results from last 7 days Lab Units 12/02/23  0921   INR  1.04     Results from last 7 days   Lab Units 12/04/23  1205 12/04/23  0753 12/03/23  2041 12/03/23  1534 12/03/23  1109 12/03/23  0711 12/02/23  2036 12/02/23  1808   POC GLUCOSE mg/dl 120 125 124 155* 127 146* 217* 145*         Results from last 7 days   Lab Units 12/03/23  0614 12/02/23  0921   PROCALCITONIN ng/ml <0.05 0.05       Lines/Drains:  Invasive Devices       Peripheral Intravenous Line  Duration             Peripheral IV 12/02/23 Left Arm 2 days    Peripheral IV 12/02/23 Distal;Right;Ventral (anterior) Forearm 1 day                          Imaging: No pertinent imaging reviewed. Recent Cultures (last 7 days):   Results from last 7 days   Lab Units 12/02/23  1832 12/02/23  1821   BLOOD CULTURE  No Growth at 24 hrs. No Growth at 24 hrs. Last 24 Hours Medication List:   Current Facility-Administered Medications   Medication Dose Route Frequency Provider Last Rate    ALPRAZolam  0.25 mg Oral BID PRN Chava Escobedo PA-C      amLODIPine  10 mg Oral Daily Mancil Senior      atorvastatin  20 mg Oral Daily [facility]      dolutegravir  50 mg Oral Daily Mancil Senior      And    lamiVUDine  300 mg Oral Daily Mancil Senior      doxylamine  12.5 mg Oral HS PRN Chava Escobedo PA-C      insulin lispro  2-12 Units Subcutaneous TID AC Jose Spruce Rodriguez      insulin lispro  2-12 Units Subcutaneous HS Mancil Senior      multi-electrolyte  75 mL/hr Intravenous Continuous Jose Spruce Rodriguez 75 mL/hr (12/04/23 0556)    ondansetron  4 mg Intravenous Q4H PRN Jose Spruce Rodriguez      pantoprazole (PROTONIX) 80 mg in sodium chloride 0.9 % 100 mL infusion  8 mg/hr Intravenous Continuous Mancil Senior 8 mg/hr (12/04/23 0009)        Today, Patient Was Seen By: Reji Rice PA-C    **Please Note: This note may have been constructed using a voice recognition system. **

## 2023-12-04 NOTE — ASSESSMENT & PLAN NOTE
Presented with painless BRBPR x 3 days. Notes first episode appeared a little dark, but subsequent stool was bright red and bloody. Hx gastric bypass, diverticulosis. Suspect possible diverticular bleed vs brisk upper GIB  Last CBC in June 2023 shows Hgb 14.7, no recent outpatient labs  Enteric stool panel pending. FOBT +. Iron panel wnl.    Hgb 8.1-8.7 on arrival to ED, s/p total 2 units PRBCs during admission  PPI gtt given hx gastric bypass, likely switch to scheduled PPI pending EGD  Hgb improved at 9.1 today, no further BRBPR & no vomiting episodes  GI following, plan for EGD/colonoscopy today to assess for GIB source

## 2023-12-05 VITALS
TEMPERATURE: 98.2 F | OXYGEN SATURATION: 97 % | BODY MASS INDEX: 31.82 KG/M2 | HEIGHT: 78 IN | WEIGHT: 275 LBS | RESPIRATION RATE: 16 BRPM | DIASTOLIC BLOOD PRESSURE: 73 MMHG | SYSTOLIC BLOOD PRESSURE: 135 MMHG | HEART RATE: 66 BPM

## 2023-12-05 LAB
ANION GAP SERPL CALCULATED.3IONS-SCNC: 9 MMOL/L
BUN SERPL-MCNC: 7 MG/DL (ref 5–25)
CALCIUM SERPL-MCNC: 8.3 MG/DL (ref 8.4–10.2)
CHLORIDE SERPL-SCNC: 101 MMOL/L (ref 96–108)
CO2 SERPL-SCNC: 26 MMOL/L (ref 21–32)
CREAT SERPL-MCNC: 0.59 MG/DL (ref 0.6–1.3)
ERYTHROCYTE [DISTWIDTH] IN BLOOD BY AUTOMATED COUNT: 15.2 % (ref 11.6–15.1)
GFR SERPL CREATININE-BSD FRML MDRD: 110 ML/MIN/1.73SQ M
GLUCOSE SERPL-MCNC: 129 MG/DL (ref 65–140)
GLUCOSE SERPL-MCNC: 132 MG/DL (ref 65–140)
HCT VFR BLD AUTO: 26.8 % (ref 36.5–49.3)
HGB BLD-MCNC: 8.9 G/DL (ref 12–17)
MCH RBC QN AUTO: 29.4 PG (ref 26.8–34.3)
MCHC RBC AUTO-ENTMCNC: 33.2 G/DL (ref 31.4–37.4)
MCV RBC AUTO: 88 FL (ref 82–98)
PLATELET # BLD AUTO: 270 THOUSANDS/UL (ref 149–390)
PMV BLD AUTO: 8.8 FL (ref 8.9–12.7)
POTASSIUM SERPL-SCNC: 3.4 MMOL/L (ref 3.5–5.3)
RBC # BLD AUTO: 3.03 MILLION/UL (ref 3.88–5.62)
SODIUM SERPL-SCNC: 136 MMOL/L (ref 135–147)
WBC # BLD AUTO: 8 THOUSAND/UL (ref 4.31–10.16)

## 2023-12-05 PROCEDURE — 99239 HOSP IP/OBS DSCHRG MGMT >30: CPT | Performed by: NURSE PRACTITIONER

## 2023-12-05 PROCEDURE — 82948 REAGENT STRIP/BLOOD GLUCOSE: CPT

## 2023-12-05 PROCEDURE — 80048 BASIC METABOLIC PNL TOTAL CA: CPT | Performed by: PHYSICIAN ASSISTANT

## 2023-12-05 PROCEDURE — 85027 COMPLETE CBC AUTOMATED: CPT | Performed by: PHYSICIAN ASSISTANT

## 2023-12-05 PROCEDURE — 99232 SBSQ HOSP IP/OBS MODERATE 35: CPT | Performed by: NURSE PRACTITIONER

## 2023-12-05 RX ORDER — PANTOPRAZOLE SODIUM 40 MG/1
40 TABLET, DELAYED RELEASE ORAL
Qty: 30 TABLET | Refills: 0 | Status: SHIPPED | OUTPATIENT
Start: 2023-12-06

## 2023-12-05 RX ADMIN — PANTOPRAZOLE SODIUM 40 MG: 40 TABLET, DELAYED RELEASE ORAL at 06:00

## 2023-12-05 RX ADMIN — LAMIVUDINE 300 MG: 150 TABLET, FILM COATED ORAL at 08:38

## 2023-12-05 RX ADMIN — ATORVASTATIN CALCIUM 20 MG: 20 TABLET, FILM COATED ORAL at 08:38

## 2023-12-05 RX ADMIN — AMLODIPINE BESYLATE 10 MG: 10 TABLET ORAL at 08:38

## 2023-12-05 RX ADMIN — DOLUTEGRAVIR SODIUM 50 MG: 50 TABLET, FILM COATED ORAL at 08:38

## 2023-12-05 NOTE — ASSESSMENT & PLAN NOTE
SIRS criteria met on admission AEB tachycardia, leukocytosis. No obvious infectious source, likely reactive 2/2 GIB. CT a/p with no acute intra-abdominal pathology  UA benign. BC x2: NGTD (24 hrs). Procal negative x2.

## 2023-12-05 NOTE — ASSESSMENT & PLAN NOTE
Presented with painless BRBPR x 3 days. Notes first episode appeared a little dark, but subsequent stool was bright red and bloody. Hx gastric bypass, diverticulosis. Last CBC in June 2023 shows Hgb 14.7, no recent outpatient labs  Enteric stool panel pending. FOBT +. Iron panel wnl.    Hgb 8.1-8.7 on arrival to ED, s/p total 2 units PRBCs during admission  Continue Protonix  GI following,   EGD- unremarkable  colonoscopy Internal (grade 2) hemorrhoids

## 2023-12-05 NOTE — PLAN OF CARE
Problem: PAIN - ADULT  Goal: Verbalizes/displays adequate comfort level or baseline comfort level  Description: Interventions:  - Encourage patient to monitor pain and request assistance  - Assess pain using appropriate pain scale  - Administer analgesics based on type and severity of pain and evaluate response  - Implement non-pharmacological measures as appropriate and evaluate response  - Consider cultural and social influences on pain and pain management  - Notify physician/advanced practitioner if interventions unsuccessful or patient reports new pain  Outcome: Progressing     Problem: INFECTION - ADULT  Goal: Absence or prevention of progression during hospitalization  Description: INTERVENTIONS:  - Assess and monitor for signs and symptoms of infection  - Monitor lab/diagnostic results  - Monitor all insertion sites, i.e. indwelling lines, tubes, and drains  - Monitor endotracheal if appropriate and nasal secretions for changes in amount and color  - [place] appropriate cooling/warming therapies per order  - Administer medications as ordered  - Instruct and encourage patient and family to use good hand hygiene technique  - Identify and instruct in appropriate isolation precautions for identified infection/condition  Outcome: Progressing  Goal: Absence of fever/infection during neutropenic period  Description: INTERVENTIONS:  - Monitor WBC    Outcome: Progressing     Problem: SAFETY ADULT  Goal: Maintain or return to baseline ADL function  Description: INTERVENTIONS:  -  Assess patient's ability to carry out ADLs; assess patient's baseline for ADL function and identify physical deficits which impact ability to perform ADLs (bathing, care of mouth/teeth, toileting, grooming, dressing, etc.)  - Assess/evaluate cause of self-care deficits   - Assess range of motion  - Assess patient's mobility; develop plan if impaired  - Assess patient's need for assistive devices and provide as appropriate  - Encourage maximum independence but intervene and supervise when necessary  - Involve family in performance of ADLs  - Assess for home care needs following discharge   - Consider OT consult to assist with ADL evaluation and planning for discharge  - Provide patient education as appropriate  Outcome: Progressing     Problem: HEMATOLOGIC - ADULT  Goal: Maintains hematologic stability  Description: INTERVENTIONS  - Assess for signs and symptoms of bleeding or hemorrhage  - Monitor labs  - Administer supportive blood products/factors as ordered and appropriate  Outcome: Progressing     Problem: METABOLIC, FLUID AND ELECTROLYTES - ADULT  Goal: Electrolytes maintained within normal limits  Description: INTERVENTIONS:  - Monitor labs and assess patient for signs and symptoms of electrolyte imbalances  - Administer electrolyte replacement as ordered  - Monitor response to electrolyte replacements, including repeat lab results as appropriate  - Instruct patient on fluid and nutrition as appropriate  Outcome: Progressing

## 2023-12-05 NOTE — ASSESSMENT & PLAN NOTE
Patient follows with HIV doctor in [facility] which is out of network, reports adherence to daily Dovato in the outpatient setting  Admitting provider reviewed prior lab results on patient's phone to confirm baseline Hgb, but were unable to find recent CD4 count labs  Continue home doletegravir-lamivudine 50-300mg daily  Continue outpatient F/U with known HIV clinic provider

## 2023-12-05 NOTE — PLAN OF CARE
Problem: Potential for Falls  Goal: Patient will remain free of falls  Description: INTERVENTIONS:  - Educate patient/family on patient safety including physical limitations  - Instruct patient to call for assistance with activity   - Consult OT/PT to assist with strengthening/mobility   - Keep Call bell within reach  - Keep bed low and locked with side rails adjusted as appropriate  - Keep care items and personal belongings within reach  - Initiate and maintain comfort rounds  - Make Fall Risk Sign visible to staff  - Offer Toileting every 2 Hours, in advance of need  - Apply yellow socks and bracelet for high fall risk patients  - Consider moving patient to room near nurses station  Outcome: Progressing     Problem: PAIN - ADULT  Goal: Verbalizes/displays adequate comfort level or baseline comfort level  Description: Interventions:  - Encourage patient to monitor pain and request assistance  - Assess pain using appropriate pain scale  - Administer analgesics based on type and severity of pain and evaluate response  - Implement non-pharmacological measures as appropriate and evaluate response  - Consider cultural and social influences on pain and pain management  - Notify physician/advanced practitioner if interventions unsuccessful or patient reports new pain  Outcome: Progressing     Problem: INFECTION - ADULT  Goal: Absence or prevention of progression during hospitalization  Description: INTERVENTIONS:  - Assess and monitor for signs and symptoms of infection  - Monitor lab/diagnostic results  - Administer medications as ordered  - Instruct and encourage patient and family to use good hand hygiene technique  Outcome: Progressing  Goal: Absence of fever/infection during neutropenic period  Description: INTERVENTIONS:  - Monitor WBC    Outcome: Progressing     Problem: SAFETY ADULT  Goal: Patient will remain free of falls  Description: INTERVENTIONS:  - Educate patient/family on patient safety including physical limitations  - Instruct patient to call for assistance with activity   - Consult OT/PT to assist with strengthening/mobility   - Keep Call bell within reach  - Keep bed low and locked with side rails adjusted as appropriate  - Keep care items and personal belongings within reach  - Initiate and maintain comfort rounds  - Make Fall Risk Sign visible to staff  - Offer Toileting every 2 Hours, in advance of need  - Apply yellow socks and bracelet for high fall risk patients  - Consider moving patient to room near nurses station  Outcome: Progressing  Goal: Maintain or return to baseline ADL function  Description: INTERVENTIONS:  -  Assess patient's ability to carry out ADLs; assess patient's baseline for ADL function and identify physical deficits which impact ability to perform ADLs (bathing, care of mouth/teeth, toileting, grooming, dressing, etc.)  - Assess/evaluate cause of self-care deficits   - Assess range of motion  - Assess patient's mobility; develop plan if impaired  - Assess patient's need for assistive devices and provide as appropriate  - Encourage maximum independence but intervene and supervise when necessary  - Involve family in performance of ADLs  - Assess for home care needs following discharge   - Consider OT consult to assist with ADL evaluation and planning for discharge  - Provide patient education as appropriate  Outcome: Progressing  Goal: Maintains/Returns to pre admission functional level  Description: INTERVENTIONS:  - Perform AM-PAC 6 Click Basic Mobility/ Daily Activity assessment daily.  - Set and communicate daily mobility goal to care team and patient/family/caregiver. - Collaborate with rehabilitation services on mobility goals if consulted  - Perform Range of Motion 3 times a day. .  - Dangle patient 3 times a day  - Stand patient 3 times a day  - Ambulate patient 3 times a day  - Out of bed to chair 3 times a day   - Out of bed for meals 3 times a day  - Out of bed for toileting  - Record patient progress and toleration of activity level   Outcome: Progressing     Problem: DISCHARGE PLANNING  Goal: Discharge to home or other facility with appropriate resources  Description: INTERVENTIONS:  - Identify barriers to discharge w/patient and caregiver  - Arrange for needed discharge resources and transportation as appropriate  - Identify discharge learning needs (meds, wound care, etc.)  - Arrange for interpretive services to assist at discharge as needed  - Refer to Case Management Department for coordinating discharge planning if the patient needs post-hospital services based on physician/advanced practitioner order or complex needs related to functional status, cognitive ability, or social support system  Outcome: Progressing     Problem: Knowledge Deficit  Goal: Patient/family/caregiver demonstrates understanding of disease process, treatment plan, medications, and discharge instructions  Description: Complete learning assessment and assess knowledge base.   Interventions:  - Provide teaching at level of understanding  - Provide teaching via preferred learning methods  Outcome: Progressing     Problem: METABOLIC, FLUID AND ELECTROLYTES - ADULT  Goal: Electrolytes maintained within normal limits  Description: INTERVENTIONS:  - Monitor labs and assess patient for signs and symptoms of electrolyte imbalances  - Administer electrolyte replacement as ordered  - Monitor response to electrolyte replacements, including repeat lab results as appropriate  - Instruct patient on fluid and nutrition as appropriate  Outcome: Progressing  Goal: Fluid balance maintained  Description: INTERVENTIONS:  - Monitor labs   - Monitor I/O and WT  - Instruct patient on fluid and nutrition as appropriate  - Assess for signs & symptoms of volume excess or deficit  Outcome: Progressing  Goal: Glucose maintained within target range  Description: INTERVENTIONS:  - Monitor Blood Glucose as ordered  - Assess for signs and symptoms of hyperglycemia and hypoglycemia  - Administer ordered medications to maintain glucose within target range  - Assess nutritional intake and initiate nutrition service referral as needed  Outcome: Progressing     Problem: HEMATOLOGIC - ADULT  Goal: Maintains hematologic stability  Description: INTERVENTIONS  - Assess for signs and symptoms of bleeding or hemorrhage  - Monitor labs  - Administer supportive blood products/factors as ordered and appropriate  Outcome: Progressing

## 2023-12-05 NOTE — PROGRESS NOTES
Progress Note - [facility]  Silvia Levine 59 y.o. male MRN: [id number]    Unit/Bed#: -01 Encounter: [id number]      Assessment/Plan: 1. Hematochezia  68-year-old gentleman, history of gastric bypass, HIV, history of cholecystectomy, presents with hematochezia for 4 days. Iron panel within normal limits. Hemoglobin on admission 8.7. Hemoglobin has been stable throughout hospital stay. Hemoglobin 8.6 today. EGD and colonoscopy done 12/4. Normal colonoscopy except for grade 2 hemorrhoids. EGD showed status post gastric bypass. No evidence of upper GI bleeding. Hematochezia may have been secondary to hemorrhoidal bleed. -Diet as tolerated await  -Await pathology  -Recommend following up with hematology for further evaluation of anemia.  -Okay to discharge from GI standpoint    Subjective:   Lying in bed in no acute distress. Tolerating diet. Denies nausea or vomiting. Denies abdominal pain. Denies any further episodes of bright red blood in stool, bright red blood from rectal area, or black tarry stool. Objective:     Vitals: Blood pressure 135/73, pulse 66, temperature 98.2 °F (36.8 °C), temperature source Oral, resp. rate 16, height 6' 6" (1.981 m), weight 125 kg (275 lb), SpO2 97 %. ,Body mass index is 31.78 kg/m². Intake/Output Summary (Last 24 hours) at 12/5/2023 0951  Last data filed at 12/4/2023 1403  Gross per 24 hour   Intake 200 ml   Output --   Net 200 ml       Physical Exam: Physical Exam  Vitals and nursing note reviewed. Constitutional:       General: He is not in acute distress. Cardiovascular:      Rate and Rhythm: Normal rate and regular rhythm. Pulses: Normal pulses. Heart sounds: Normal heart sounds. Pulmonary:      Effort: Pulmonary effort is normal. No respiratory distress. Breath sounds: Normal breath sounds. No stridor. No wheezing, rhonchi or rales. Abdominal:      General: Bowel sounds are normal. There is no distension.       Palpations: Abdomen is soft. There is no mass. Tenderness: There is no abdominal tenderness. There is no guarding or rebound. Hernia: No hernia is present. Musculoskeletal:      Right lower leg: No edema. Left lower leg: No edema. Skin:     General: Skin is warm and dry. Capillary Refill: Capillary refill takes less than 2 seconds. Coloration: Skin is not jaundiced or pale. Neurological:      Mental Status: He is alert and oriented to person, place, and time.    Psychiatric:         Mood and Affect: Mood normal.         Invasive Devices       Peripheral Intravenous Line  Duration             Peripheral IV 12/02/23 Left Arm 3 days                    Current Facility-Administered Medications:     ALPRAZolam (XANAX) tablet 0.25 mg, 0.25 mg, Oral, BID PRN, Cyrena Settler, PA-C, 0.25 mg at 12/04/23 2130    amLODIPine (NORVASC) tablet 10 mg, 10 mg, Oral, Daily, Jeff [place], 10 mg at 12/05/23 6786    atorvastatin (LIPITOR) tablet 20 mg, 20 mg, Oral, Daily, Jeff [place], 20 mg at 12/05/23 8281    dolutegravir (TIVICAY) tablet 50 mg, 50 mg, Oral, Daily, 50 mg at 12/05/23 0126 **AND** lamiVUDine (EPIVIR) tablet 300 mg, 300 mg, Oral, Daily, Jeff [place], 300 mg at 12/05/23 3711    doxylamine (UNISOM) tablet 12.5 mg, 12.5 mg, Oral, HS PRN, Cyrena Settler, PA-C, 12.5 mg at 12/04/23 2151    insulin lispro (HumaLOG) 100 units/mL subcutaneous injection 2-12 Units, 2-12 Units, Subcutaneous, TID AC, 2 Units at 12/03/23 1702 **AND** Fingerstick Glucose (POCT), , , TID AC, Jeff [place]    insulin lispro (HumaLOG) 100 units/mL subcutaneous injection 2-12 Units, 2-12 Units, Subcutaneous, HS, Jeff [place], 4 Units at 12/02/23 2136    ondansetron [facility]) injection 4 mg, 4 mg, Intravenous, Q4H PRN, Jeff [place]    pantoprazole (PROTONIX) EC tablet 40 mg, 40 mg, Oral, Early Morning, Mckayla Severino PA-C, 40 mg at 12/05/23 0600    Lab Results:   Recent Results (from the past 24 hour(s))   Fingerstick Glucose (POCT)    Collection Time: 12/04/23 12:05 PM   Result Value Ref Range    POC Glucose 120 65 - 140 mg/dl   Fingerstick Glucose (POCT)    Collection Time: 12/04/23  3:29 PM   Result Value Ref Range    POC Glucose 116 65 - 140 mg/dl   Fingerstick Glucose (POCT)    Collection Time: 12/04/23  9:15 PM   Result Value Ref Range    POC Glucose 105 65 - 140 mg/dl   CBC    Collection Time: 12/05/23  6:07 AM   Result Value Ref Range    WBC 8.00 4.31 - 10.16 Thousand/uL    RBC 3.03 (L) 3.88 - 5.62 Million/uL    Hemoglobin 8.9 (L) 12.0 - 17.0 g/dL    Hematocrit 26.8 (L) 36.5 - 49.3 %    MCV 88 82 - 98 fL    MCH 29.4 26.8 - 34.3 pg    MCHC 33.2 31.4 - 37.4 g/dL    RDW 15.2 (H) 11.6 - 15.1 %    Platelets 631 704 - 950 Thousands/uL    MPV 8.8 (L) 8.9 - 12.7 fL   Basic metabolic panel    Collection Time: 12/05/23  6:07 AM   Result Value Ref Range    Sodium 136 135 - 147 mmol/L    Potassium 3.4 (L) 3.5 - 5.3 mmol/L    Chloride 101 96 - 108 mmol/L    CO2 26 21 - 32 mmol/L    ANION GAP 9 mmol/L    BUN 7 5 - 25 mg/dL    Creatinine 0.59 (L) 0.60 - 1.30 mg/dL    Glucose 129 65 - 140 mg/dL    Calcium 8.3 (L) 8.4 - 10.2 mg/dL    eGFR 110 ml/min/1.73sq m   Fingerstick Glucose (POCT)    Collection Time: 12/05/23  7:27 AM   Result Value Ref Range    POC Glucose 132 65 - 140 mg/dl             Imaging Studies: Colonoscopy    Result Date: 12/4/2023  Narrative: Table formatting from the original result was not included. [id number] [facility] [address] [place] [phone] [place] [address] OF SERVICE: 12/04/23 PHYSICIAN(S): Attending: Lianne Rae MD Fellow: No Staff Documented INDICATION: GI bleed POST-OP DIAGNOSIS: See the impression below. HISTORY: Prior colonoscopy: 5 years ago. BOWEL PREPARATION:  PREPROCEDURE: Informed consent was obtained for the procedure, including sedation.  Risks including but not limited to bleeding, infection, perforation, adverse drug reaction and aspiration were explained in detail. Also explained about less than 100% sensitivity with the exam and other alternatives. The patient was placed in the left lateral decubitus position. Procedure: Colonoscopy DETAILS OF PROCEDURE: Patient was taken to the procedure room where a time out was performed to confirm correct patient and correct procedure. The patient underwent monitored anesthesia care, which was administered by an anesthesia professional. The patient's blood pressure, heart rate, level of consciousness, oxygen, respirations and ECG were monitored throughout the procedure. A digital rectal exam was performed. The scope was introduced through the anus and advanced to the terminal ileum. Retroflexion was performed in the rectum. Bowel prep was not adequate. The patient experienced no blood loss. The procedure was not difficult. The patient tolerated the procedure well. There were no apparent adverse events. ANESTHESIA INFORMATION: ASA: ASA status not filed in the log. Anesthesia Type: Anesthesia type not filed in the log.  MEDICATIONS: No administrations occurring from 1328 to 1404 on 12/04/23 FINDINGS: Internal (grade 2) hemorrhoids The remainder of a detailed exam was within normal limits including the appendix opening and distal terminal ileum, keep in mind prep was suboptimal for small polyp screening EVENTS: Procedure Events Event Event Time ENDO CECUM REACHED 12/4/2023  1:52 PM ENDO SCOPE OUT TIME 12/4/2023  2:03 PM SPECIMENS: ID Type Source Tests Collected by Time Destination 1 : Cold Biopsy Anastamosis/Dyspasia/ R/O H pylori Tissue Stomach TISSUE EXAM Stewart Smith MD 12/4/2023  1:39 PM  2 : Cold Biopsy EG Junction R/O Barretts Tissue Esophagogastric junction TISSUE EXAM Stewart Smith MD 12/4/2023  1:40 PM  EQUIPMENT: Colonoscope -Q160L     Impression: (grade 2) hemorrhoids Normal. RECOMMENDATION:  Repeat colonoscopy in 1 year  Inadequate bowel preparation   Advance diet and observe    Leyda Master Juan Tran MD     EGD    Result Date: 12/4/2023  Narrative: Table formatting from the original result was not included. [id number] [facility] [address] [place] [id number] [facility] OF SERVICE: 12/04/23 PHYSICIAN(S): Attending: Enedina Boeck, MD Fellow: No Staff Documented INDICATION: GI bleed POST-OP DIAGNOSIS: See the impression below. PREPROCEDURE: Informed consent was obtained for the procedure, including sedation. Risks of perforation, hemorrhage, adverse drug reaction and aspiration were discussed. The patient was placed in the left lateral decubitus position. Patient was explained about the risks and benefits of the procedure. Risks including but not limited to bleeding, infection, and perforation were explained in detail. Also explained about less than 100% sensitivity with the exam and other alternatives. PROCEDURE: EGD DETAILS OF PROCEDURE: Patient was taken to the procedure room where a time out was performed to confirm correct patient and correct procedure. The patient underwent monitored anesthesia care, which was administered by an anesthesia professional. The patient's blood pressure, heart rate, level of consciousness, respirations and oxygen were monitored throughout the procedure. The scope was advanced to the jejunum. Retroflexion was performed in the fundus. The patient experienced no blood loss. The procedure was not difficult. The patient tolerated the procedure well. There were no apparent adverse events. ANESTHESIA INFORMATION: ASA: ASA status not filed in the log. Anesthesia Type: Anesthesia type not filed in the log. MEDICATIONS: No administrations occurring from 1328 to 1341 on 12/04/23 FINDINGS: Status post gastric bypass scope was advanced to the jejunal loop no ulcers were seen no old or new blood. Anastomosis was open and biopsied. Check for H. pylori and dysplasia.  Irregular Z-line 40 cm from the incisors; performed cold forceps biopsy The remainder of a detailed exam otherwise unremarkable SPECIMENS: ID Type Source Tests Collected by Time Destination 1 : Cold Biopsy Anastamosis/Dyspasia/ R/O H pylori Tissue Stomach TISSUE EXAM Fanta Adair MD 12/4/2023  1:39 PM  2 : Cold Biopsy EG Junction R/O Barretts Tissue Esophagogastric junction TISSUE EXAM Fanta Adair MD 12/4/2023  1:40 PM      Impression: Status post gastric biopsy past no evidence of upper GI bleeding. RECOMMENDATION:  Await pathology results  Advance diet if Kuhn's esophagus repeat EGD 1 year if H. pylori positive treat accordingly. Fanta Adair MD     CT abdomen pelvis with contrast    Result Date: 12/2/2023  Narrative: CT ABDOMEN AND PELVIS WITH IV CONTRAST INDICATION:   mild diffuse tenderness. COMPARISON:  None. TECHNIQUE:  CT examination of the abdomen and pelvis was performed. Multiplanar 2D reformatted images were created from the source data. This examination, like all CT scans performed in the [facility], was performed utilizing techniques to minimize radiation dose exposure, including the use of iterative reconstruction and automated exposure control. Radiation dose length product (DLP) for this visit:  1597 mGy-cm IV Contrast:  100 mL of iohexol (OMNIPAQUE) 50 mL of iohexol (OMNIPAQUE) Enteric Contrast:  Enteric contrast was administered. FINDINGS: ABDOMEN LOWER CHEST:  No clinically significant abnormality identified in the visualized lower chest. LIVER/BILIARY TREE:  Unremarkable. GALLBLADDER: Gallbladder is surgically absent. SPLEEN:  Unremarkable. PANCREAS:  Unremarkable. ADRENAL GLANDS:  Unremarkable. KIDNEYS/URETERS:  No hydronephrosis or urinary tract calculus. Right double collecting system. One or more sharply circumscribed subcentimeter renal hypodensities are present, too small to accurately characterize, and statistically most likely benign findings.  According to recent literature (Radiology 2019) no further workup of these findings is recommended. STOMACH AND BOWEL: Status post gastric bypass. Small duodenal diverticulum. No bowel obstruction or inflammatory change. Contrast reaches distal small bowel loops. APPENDIX:  No findings to suggest appendicitis. ABDOMINOPELVIC CAVITY:  No ascites. No pneumoperitoneum. No lymphadenopathy. VESSELS:  Unremarkable for patient's age. PELVIS REPRODUCTIVE ORGANS:  Unremarkable for patient's age. URINARY BLADDER:  Unremarkable. ABDOMINAL WALL/INGUINAL REGIONS: Fat-containing right inguinal hernia. OSSEOUS STRUCTURES:  No acute fracture or destructive osseous lesion. Spine degenerative change. Impression: 1. No acute intra-abdominal pathology. Incidental findings as above. Workstation performed: [id number]           [address] [place] [address]      Please Note: "This note has been constructed using a voice recognition system. Therefore there may be syntax, spelling, and/or grammatical errors.  Please call if you have any questions. "**

## 2023-12-05 NOTE — DISCHARGE SUMMARY
[address]  Discharge- Marcia Clink 1965, 59 y.o. male MRN: [id number]  Unit/Bed#: -01 Encounter: [id number]  Primary Care Provider: No primary care provider on file. Date and time admitted to hospital: 12/2/2023  9:09 AM    * GI bleed  Assessment & Plan  Presented with painless BRBPR x 3 days. Notes first episode appeared a little dark, but subsequent stool was bright red and bloody. Hx gastric bypass, diverticulosis. Last CBC in June 2023 shows Hgb 14.7, no recent outpatient labs  Enteric stool panel pending. FOBT +. Iron panel wnl. Hgb 8.1-8.7 on arrival to ED, s/p total 2 units PRBCs during admission  Continue Protonix  GI following,   EGD- unremarkable  colonoscopy Internal (grade 2) hemorrhoids     HIV (human immunodeficiency virus) infection ([address] )  Assessment & Plan  Patient follows with HIV doctor in [facility] which is out of network, reports adherence to daily Dovato in the outpatient setting  Admitting provider reviewed prior lab results on patient's phone to confirm baseline Hgb, but were unable to find recent CD4 count labs  Continue home doletegravir-lamivudine 50-300mg daily  Continue outpatient F/U with known HIV clinic provider    Prediabetes  Assessment & Plan  Patient on Trulicity as outpatient, last FGSS0Y 5.9  Hold Trulicity, SSI coverage with AccuCheks ACHS while inpatient    HLD (hyperlipidemia)  Assessment & Plan  Continue home atorvastatin     SIRS (systemic inflammatory response syndrome) ([address] )  Assessment & Plan  SIRS criteria met on admission AEB tachycardia, leukocytosis. No obvious infectious source, likely reactive 2/2 GIB. CT a/p with no acute intra-abdominal pathology  UA benign. BC x2: NGTD (24 hrs). Procal negative x2. Discharging Physician / Practitioner: LEXA Nino  PCP: No primary care provider on file.   Admission Date:   Admission Orders (From admission, onward)       Ordered        12/02/23 1510  INPATIENT ADMISSION  Once                          Discharge Date: 12/05/23    Medical Problems       Resolved Problems  Date Reviewed: 12/4/2023   None         Consultations During Hospital Stay:  IP CONSULT TO GASTROENTEROLOGY    Procedures Performed:   Colonoscopy    Result Date: 12/4/2023  Narrative: Table formatting from the original result was not included. [address]  [facility] [address] [facility] [id number] [facility] SERVICE: 12/04/23 PHYSICIAN(S): Attending: Mai Walters MD Fellow: No Staff Documented INDICATION: GI bleed POST-OP DIAGNOSIS: See the impression below. HISTORY: Prior colonoscopy: 5 years ago. BOWEL PREPARATION:  PREPROCEDURE: Informed consent was obtained for the procedure, including sedation. Risks including but not limited to bleeding, infection, perforation, adverse drug reaction and aspiration were explained in detail. Also explained about less than 100% sensitivity with the exam and other alternatives. The patient was placed in the left lateral decubitus position. Procedure: Colonoscopy DETAILS OF PROCEDURE: Patient was taken to the procedure room where a time out was performed to confirm correct patient and correct procedure. The patient underwent monitored anesthesia care, which was administered by an anesthesia professional. The patient's blood pressure, heart rate, level of consciousness, oxygen, respirations and ECG were monitored throughout the procedure. A digital rectal exam was performed. The scope was introduced through the anus and advanced to the terminal ileum. Retroflexion was performed in the rectum. Bowel prep was not adequate. The patient experienced no blood loss. The procedure was not difficult. The patient tolerated the procedure well. There were no apparent adverse events. ANESTHESIA INFORMATION: ASA: ASA status not filed in the log. Anesthesia Type: Anesthesia type not filed in the log.  MEDICATIONS: No administrations occurring from 1328 to [id number] on 12/04/23 FINDINGS: Internal (grade 2) hemorrhoids The remainder of a detailed exam was within normal limits including the appendix opening and distal terminal ileum, keep in mind prep was suboptimal for small polyp screening EVENTS: Procedure Events Event Event Time ENDO CECUM REACHED 12/4/2023  1:52 PM ENDO SCOPE OUT TIME 12/4/2023  2:03 PM SPECIMENS: ID Type Source Tests Collected by Time Destination 1 : Cold Biopsy Anastamosis/Dyspasia/ R/O H pylori Tissue Stomach TISSUE EXAM Fareed Garcia MD 12/4/2023  1:39 PM  2 : Cold Biopsy EG Junction R/O Barretts Tissue Esophagogastric junction TISSUE EXAM Fareed Garcia MD 12/4/2023  1:40 PM  EQUIPMENT: Colonoscope -[id number]     Impression: (grade 2) hemorrhoids Normal. RECOMMENDATION:  Repeat colonoscopy in 1 year  Inadequate bowel preparation   Advance diet and observe    Fareed Garcia MD     EGD    Result Date: 12/4/2023  Narrative: Table formatting from the original result was not included. [address] [facility] [address] [place] [id number] [facility] OF SERVICE: 12/04/23 PHYSICIAN(S): Attending: Fareed Garcia MD Fellow: No Staff Documented INDICATION: GI bleed POST-OP DIAGNOSIS: See the impression below. PREPROCEDURE: Informed consent was obtained for the procedure, including sedation. Risks of perforation, hemorrhage, adverse drug reaction and aspiration were discussed. The patient was placed in the left lateral decubitus position. Patient was explained about the risks and benefits of the procedure. Risks including but not limited to bleeding, infection, and perforation were explained in detail. Also explained about less than 100% sensitivity with the exam and other alternatives. PROCEDURE: EGD DETAILS OF PROCEDURE: Patient was taken to the procedure room where a time out was performed to confirm correct patient and correct procedure.  The patient underwent monitored anesthesia care, which was administered by an anesthesia professional. The patient's blood pressure, heart rate, level of consciousness, respirations and oxygen were monitored throughout the procedure. The scope was advanced to the jejunum. Retroflexion was performed in the fundus. The patient experienced no blood loss. The procedure was not difficult. The patient tolerated the procedure well. There were no apparent adverse events. ANESTHESIA INFORMATION: ASA: ASA status not filed in the log. Anesthesia Type: Anesthesia type not filed in the log. MEDICATIONS: No administrations occurring from 1328 to 1341 on 12/04/23 FINDINGS: Status post gastric bypass scope was advanced to the jejunal loop no ulcers were seen no old or new blood. Anastomosis was open and biopsied. Check for H. pylori and dysplasia. Irregular Z-line 40 cm from the incisors; performed cold forceps biopsy The remainder of a detailed exam otherwise unremarkable SPECIMENS: ID Type Source Tests Collected by Time Destination 1 : Cold Biopsy Anastamosis/Dyspasia/ R/O H pylori Tissue Stomach TISSUE EXAM Fanta Adair MD 12/4/2023  1:39 PM  2 : Cold Biopsy EG Junction R/O Barretts Tissue Esophagogastric junction TISSUE EXAM Fanta Adair MD 12/4/2023  1:40 PM      Impression: Status post gastric biopsy past no evidence of upper GI bleeding. RECOMMENDATION:  Await pathology results  Advance diet if Kuhn's esophagus repeat EGD 1 year if H. pylori positive treat accordingly. Fanta Adair MD     CT abdomen pelvis with contrast    Result Date: 12/2/2023  Narrative: CT ABDOMEN AND PELVIS WITH IV CONTRAST INDICATION:   mild diffuse tenderness. COMPARISON:  None. TECHNIQUE:  CT examination of the abdomen and pelvis was performed. Multiplanar 2D reformatted images were created from the source data.  This examination, like all CT scans performed in the [facility], was performed utilizing techniques to minimize radiation dose exposure, including the use of iterative reconstruction and automated exposure control. Radiation dose length product (DLP) for this visit:  1597 mGy-cm IV Contrast:  100 mL of iohexol (OMNIPAQUE) 50 mL of iohexol (OMNIPAQUE) Enteric Contrast:  Enteric contrast was administered. FINDINGS: ABDOMEN LOWER CHEST:  No clinically significant abnormality identified in the visualized lower chest. LIVER/BILIARY TREE:  Unremarkable. GALLBLADDER: Gallbladder is surgically absent. SPLEEN:  Unremarkable. PANCREAS:  Unremarkable. ADRENAL GLANDS:  Unremarkable. KIDNEYS/URETERS:  No hydronephrosis or urinary tract calculus. Right double collecting system. One or more sharply circumscribed subcentimeter renal hypodensities are present, too small to accurately characterize, and statistically most likely benign findings. According to recent literature (Radiology 2019) no further workup of these findings is recommended. STOMACH AND BOWEL: Status post gastric bypass. Small duodenal diverticulum. No bowel obstruction or inflammatory change. Contrast reaches distal small bowel loops. APPENDIX:  No findings to suggest appendicitis. ABDOMINOPELVIC CAVITY:  No ascites. No pneumoperitoneum. No lymphadenopathy. VESSELS:  Unremarkable for patient's age. PELVIS REPRODUCTIVE ORGANS:  Unremarkable for patient's age. URINARY BLADDER:  Unremarkable. ABDOMINAL WALL/INGUINAL REGIONS: Fat-containing right inguinal hernia. OSSEOUS STRUCTURES:  No acute fracture or destructive osseous lesion. Spine degenerative change. Impression: 1. No acute intra-abdominal pathology. Incidental findings as above.  Workstation performed: [id number]         Significant Findings / Test Results:   above    Incidental Findings:   no     Test Results Pending at Discharge (will require follow up):   no     Outpatient Tests Requested:  no    Complications:  no    Past Medical History:   Diagnosis Date    HIV (human immunodeficiency virus infection) ([address])     Hypertension        Reason for Admission: Rectal Bleeding (Pt presents to ED from home w/ dizziness, blood in stool, fatigue for two days. Blood is elizabeth red.)       Hospital Course:     Cleveland Cash is a 59 y.o. male patient with past medical history of above who originally presented to the hospital on 12/2/2023 due to Rectal Bleeding (Pt presents to ED from home w/ dizziness, blood in stool, fatigue for two days. Blood is elizabeth red.) came in with bleed, evaluated by GI, EGD and colo complete Hemorids were found. Hgb remained stable, continue outpatient follow up. Please see above list of diagnoses and related plan for additional information. Condition at Discharge: stable     Discharge Day Visit / Exam:     Subjective:  feels well is ambulating freely, tolerating meals, no additional bleeding  Vitals: Blood Pressure: 135/73 (12/05/23 0734)  Pulse: 66 (12/05/23 0734)  Temperature: 98.2 °F (36.8 °C) (12/05/23 0734)  Temp Source: Oral (12/05/23 0734)  Respirations: 16 (12/05/23 0734)  Height: 6' 6" (198.1 cm) (12/02/23 2000)  Weight - Scale: 125 kg (275 lb) (12/02/23 2000)  SpO2: 97 % (12/05/23 0734)  Exam:   Physical Exam  Vitals reviewed. Constitutional:       General: He is not in acute distress. Appearance: He is not ill-appearing. Cardiovascular:      Rate and Rhythm: Normal rate. Pulmonary:      Effort: No respiratory distress. Abdominal:      Palpations: Abdomen is soft. Skin:     General: Skin is warm. Neurological:      Mental Status: He is alert. Mental status is at baseline. Psychiatric:         Mood and Affect: Mood normal.       Discussion with Family: not available    Discharge instructions/Information to patient and family:   See after visit summary for information provided to patient and family. Provisions for Follow-Up Care:  See after visit summary for information related to follow-up care and any pertinent home health orders.       Disposition:     Home    Planned Readmission: no Discharge Statement:  I spent 45 minutes discharging the patient. This time was spent on the day of discharge. I had direct contact with the patient on the day of discharge. Greater than 50% of the total time was spent examining patient, answering all patient questions, arranging and discussing plan of care with patient as well as directly providing post-discharge instructions. Additional time then spent on discharge activities. Discharge Medications:  See after visit summary for reconciled discharge medications provided to patient and family.       ** Please Note: This note has been constructed using a voice recognition system **

## 2023-12-05 NOTE — ASSESSMENT & PLAN NOTE
Patient on Trulicity as outpatient, last RBDE7Q 5.9  Hold Trulicity, SSI coverage with AccuCheks ACHS while inpatient

## 2023-12-06 PROCEDURE — 88305 TISSUE EXAM BY PATHOLOGIST: CPT | Performed by: STUDENT IN AN ORGANIZED HEALTH CARE EDUCATION/TRAINING PROGRAM

## 2023-12-06 NOTE — UTILIZATION REVIEW
NOTIFICATION OF ADMISSION DISCHARGE   This is a Notification of Discharge from [facility]. Please be advised that this patient has been discharge from our facility. Below you will find the admission and discharge date and time including the patient’s disposition. UTILIZATION REVIEW CONTACT:  Carlitos Asencio  Utilization   Network Utilization Review Department  Phone: [phone] x carefully listen to the prompts. All voicemails are confidential.  Email: [email or web address]     ADMISSION INFORMATION  PRESENTATION DATE: 12/2/2023  9:09 AM  OBERVATION ADMISSION DATE:   INPATIENT ADMISSION DATE: 12/2/23  3:10 PM   DISCHARGE DATE: 12/5/2023 11:19 AM   DISPOSITION:Home/Self Care    Network Utilization Review Department  ATTENTION: Please call with any questions or concerns to [phone] and carefully listen to the prompts so that you are directed to the right person. All voicemails are confidential.   For Discharge needs, contact Care Management DC Support Team at [phone] opt. 2  Send all requests for admission clinical reviews, approved or denied determinations and any other requests to dedicated fax number below belonging to the campus where the patient is receiving treatment.  List of dedicated fax numbers for the Facilities:  [place] DENIALS (Administrative/Medical Necessity) [phone]   DISCHARGE SUPPORT TEAM (Network) [phone] [address] [facility] (Maternity/NICU/Pediatrics) [phone]   [address] [facility] [address] [place] [address] [address] [phone]

## 2023-12-07 NOTE — RESULT ENCOUNTER NOTE
Patient was informed via my chart biopsies were benign. Repeat colonoscopy 1 year due to inadequate prep.

## 2023-12-08 ENCOUNTER — TELEPHONE (OUTPATIENT)
Dept: HEMATOLOGY ONCOLOGY | Facility: CLINIC | Age: 58
End: 2023-12-08

## 2023-12-08 LAB
BACTERIA BLD CULT: NORMAL
BACTERIA BLD CULT: NORMAL

## 2023-12-08 NOTE — TELEPHONE ENCOUNTER
Appointment Schedule   Who are you speaking with? Patient   If it is not the patient, are they listed on an active communication consent form? N/A   Which provider is the appointment scheduled with? Dr. Gloria Portillo   At which location is the appointment scheduled for? [facility]   When is the appointment scheduled? Please list date and time 1/10/24 @ 920   What is the reason for this appointment? Rectal bleeding/ follow up from being seen in hospital   Did patient voice understanding of the details of this appointment? Yes   Was the no show policy reviewed with patient?  Yes

## 2023-12-11 ENCOUNTER — TELEPHONE (OUTPATIENT)
Age: 58
End: 2023-12-11

## 2023-12-11 NOTE — TELEPHONE ENCOUNTER
Pt called in stating that someone left him a VM stating to return our call. Did not leave a name or why they were calling. Do not see any notes indicating anyone reached out. Pt stated ok I hope I'm not dying, laughed and disconnected the call.

## 2023-12-18 NOTE — ANESTHESIA POSTPROCEDURE EVALUATION
Post-Op Assessment Note    CV Status:  Stable  Pain Score: 0    Pain management: adequate       Mental Status:  Alert   Hydration Status:  Stable   PONV Controlled:  None   Airway Patency:  Patent and inadequate    No anethesia notable event occurred.     Staff: CRNA               BP   118/55   Temp 97.5 F   Pulse 78   Resp 16   SpO2 100%
independent

## 2025-01-22 ENCOUNTER — TELEPHONE (OUTPATIENT)
Dept: GASTROENTEROLOGY | Facility: CLINIC | Age: 60
End: 2025-01-22

## 2025-01-22 NOTE — TELEPHONE ENCOUNTER
I attempted to call pt to schedule a colon recall . Number listed in chart is not valid. Will send recall ltr to address in file. Sb